# Patient Record
Sex: MALE | Race: BLACK OR AFRICAN AMERICAN | NOT HISPANIC OR LATINO | Employment: OTHER | URBAN - METROPOLITAN AREA
[De-identification: names, ages, dates, MRNs, and addresses within clinical notes are randomized per-mention and may not be internally consistent; named-entity substitution may affect disease eponyms.]

---

## 2022-11-01 ENCOUNTER — APPOINTMENT (OUTPATIENT)
Dept: CARDIOLOGY | Facility: HOSPITAL | Age: 62
End: 2022-11-01

## 2022-11-01 ENCOUNTER — APPOINTMENT (OUTPATIENT)
Dept: GENERAL RADIOLOGY | Facility: HOSPITAL | Age: 62
End: 2022-11-01

## 2022-11-01 ENCOUNTER — HOSPITAL ENCOUNTER (INPATIENT)
Facility: HOSPITAL | Age: 62
LOS: 2 days | Discharge: HOME | End: 2022-11-04
Attending: EMERGENCY MEDICINE | Admitting: INTERNAL MEDICINE

## 2022-11-01 DIAGNOSIS — I25.700 CORONARY ARTERY DISEASE INVOLVING CORONARY BYPASS GRAFT OF NATIVE HEART WITH UNSTABLE ANGINA PECTORIS: ICD-10-CM

## 2022-11-01 DIAGNOSIS — R07.2 PRECORDIAL PAIN: Primary | ICD-10-CM

## 2022-11-01 DIAGNOSIS — R07.9 CHEST PAIN, UNSPECIFIED TYPE: ICD-10-CM

## 2022-11-01 DIAGNOSIS — I20.0 UNSTABLE ANGINA: ICD-10-CM

## 2022-11-01 LAB
ALBUMIN SERPL-MCNC: 4.5 G/DL (ref 3.5–5.2)
ALBUMIN/GLOB SERPL: 1.3 G/DL
ALP SERPL-CCNC: 90 U/L (ref 39–117)
ALT SERPL W P-5'-P-CCNC: 38 U/L (ref 1–41)
ANION GAP SERPL CALCULATED.3IONS-SCNC: 11 MMOL/L (ref 5–15)
ANION GAP SERPL CALCULATED.3IONS-SCNC: 9 MMOL/L (ref 5–15)
ASCENDING AORTA: 3.2 CM
AST SERPL-CCNC: 30 U/L (ref 1–40)
BASOPHILS # BLD AUTO: 0.01 10*3/MM3 (ref 0–0.2)
BASOPHILS NFR BLD AUTO: 0.2 % (ref 0–1.5)
BH CV ECHO MEAS - AO MAX PG: 3.7 MMHG
BH CV ECHO MEAS - AO MEAN PG: 1.67 MMHG
BH CV ECHO MEAS - AO ROOT DIAM: 3.2 CM
BH CV ECHO MEAS - AO V2 MAX: 95.7 CM/SEC
BH CV ECHO MEAS - AO V2 VTI: 18 CM
BH CV ECHO MEAS - AVA(I,D): 2.9 CM2
BH CV ECHO MEAS - EDV(CUBED): 59.3 ML
BH CV ECHO MEAS - EDV(MOD-SP2): 74.3 ML
BH CV ECHO MEAS - EDV(MOD-SP4): 80.6 ML
BH CV ECHO MEAS - EF(MOD-BP): 52 %
BH CV ECHO MEAS - EF(MOD-SP2): 47.5 %
BH CV ECHO MEAS - EF(MOD-SP4): 57.8 %
BH CV ECHO MEAS - ESV(CUBED): 22 ML
BH CV ECHO MEAS - ESV(MOD-SP2): 39 ML
BH CV ECHO MEAS - ESV(MOD-SP4): 34 ML
BH CV ECHO MEAS - FS: 28.2 %
BH CV ECHO MEAS - IVS/LVPW: 1.08 CM
BH CV ECHO MEAS - IVSD: 1.3 CM
BH CV ECHO MEAS - LA DIMENSION: 3 CM
BH CV ECHO MEAS - LAT PEAK E' VEL: 8.6 CM/SEC
BH CV ECHO MEAS - LV DIASTOLIC VOL/BSA (35-75): 50.1 CM2
BH CV ECHO MEAS - LV MASS(C)D: 169.4 GRAMS
BH CV ECHO MEAS - LV MAX PG: 3.4 MMHG
BH CV ECHO MEAS - LV MEAN PG: 2 MMHG
BH CV ECHO MEAS - LV SYSTOLIC VOL/BSA (12-30): 21.1 CM2
BH CV ECHO MEAS - LV V1 MAX: 92.8 CM/SEC
BH CV ECHO MEAS - LV V1 VTI: 16.4 CM
BH CV ECHO MEAS - LVIDD: 3.9 CM
BH CV ECHO MEAS - LVIDS: 2.8 CM
BH CV ECHO MEAS - LVOT AREA: 3.1 CM2
BH CV ECHO MEAS - LVOT DIAM: 2 CM
BH CV ECHO MEAS - LVPWD: 1.2 CM
BH CV ECHO MEAS - MED PEAK E' VEL: 4.6 CM/SEC
BH CV ECHO MEAS - MV A MAX VEL: 84.9 CM/SEC
BH CV ECHO MEAS - MV DEC SLOPE: 207 CM/SEC2
BH CV ECHO MEAS - MV DEC TIME: 0.27 MSEC
BH CV ECHO MEAS - MV E MAX VEL: 32.7 CM/SEC
BH CV ECHO MEAS - MV E/A: 0.39
BH CV ECHO MEAS - MV MAX PG: 3 MMHG
BH CV ECHO MEAS - MV MEAN PG: 1 MMHG
BH CV ECHO MEAS - MV P1/2T: 66.9 MSEC
BH CV ECHO MEAS - MV V2 VTI: 18.7 CM
BH CV ECHO MEAS - MVA(P1/2T): 3.3 CM2
BH CV ECHO MEAS - MVA(VTI): 2.8 CM2
BH CV ECHO MEAS - PA ACC TIME: 0.14 SEC
BH CV ECHO MEAS - PA PR(ACCEL): 18.2 MMHG
BH CV ECHO MEAS - PA V2 MAX: 115 CM/SEC
BH CV ECHO MEAS - SI(MOD-SP2): 21.9 ML/M2
BH CV ECHO MEAS - SI(MOD-SP4): 28.9 ML/M2
BH CV ECHO MEAS - SV(LVOT): 51.5 ML
BH CV ECHO MEAS - SV(MOD-SP2): 35.3 ML
BH CV ECHO MEAS - SV(MOD-SP4): 46.6 ML
BH CV ECHO MEAS - TAPSE (>1.6): 1.99 CM
BH CV ECHO MEASUREMENTS AVERAGE E/E' RATIO: 4.95
BH CV VAS BP LEFT ARM: NORMAL MMHG
BH CV XLRA - RV BASE: 3.2 CM
BH CV XLRA - RV LENGTH: 6 CM
BH CV XLRA - RV MID: 2.2 CM
BH CV XLRA - TDI S': 11.5 CM/SEC
BILIRUB SERPL-MCNC: 0.4 MG/DL (ref 0–1.2)
BUN SERPL-MCNC: 18 MG/DL (ref 8–23)
BUN SERPL-MCNC: 20 MG/DL (ref 8–23)
BUN/CREAT SERPL: 16 (ref 7–25)
BUN/CREAT SERPL: 17.1 (ref 7–25)
CALCIUM SPEC-SCNC: 8.7 MG/DL (ref 8.6–10.5)
CALCIUM SPEC-SCNC: 9.5 MG/DL (ref 8.6–10.5)
CHLORIDE SERPL-SCNC: 103 MMOL/L (ref 98–107)
CHLORIDE SERPL-SCNC: 103 MMOL/L (ref 98–107)
CO2 SERPL-SCNC: 27 MMOL/L (ref 22–29)
CO2 SERPL-SCNC: 27 MMOL/L (ref 22–29)
CREAT SERPL-MCNC: 1.05 MG/DL (ref 0.76–1.27)
CREAT SERPL-MCNC: 1.25 MG/DL (ref 0.76–1.27)
DEPRECATED RDW RBC AUTO: 43.9 FL (ref 37–54)
EGFRCR SERPLBLD CKD-EPI 2021: 65.1 ML/MIN/1.73
EGFRCR SERPLBLD CKD-EPI 2021: 80.3 ML/MIN/1.73
EOSINOPHIL # BLD AUTO: 0.08 10*3/MM3 (ref 0–0.4)
EOSINOPHIL NFR BLD AUTO: 1.5 % (ref 0.3–6.2)
ERYTHROCYTE [DISTWIDTH] IN BLOOD BY AUTOMATED COUNT: 14.4 % (ref 12.3–15.4)
GLOBULIN UR ELPH-MCNC: 3.6 GM/DL
GLUCOSE SERPL-MCNC: 119 MG/DL (ref 65–99)
GLUCOSE SERPL-MCNC: 119 MG/DL (ref 65–99)
HCT VFR BLD AUTO: 49.8 % (ref 37.5–51)
HGB BLD-MCNC: 15.8 G/DL (ref 13–17.7)
HOLD SPECIMEN: NORMAL
IMM GRANULOCYTES # BLD AUTO: 0.01 10*3/MM3 (ref 0–0.05)
IMM GRANULOCYTES NFR BLD AUTO: 0.2 % (ref 0–0.5)
LEFT ATRIUM VOLUME INDEX: 22.2 ML/M2
LIPASE SERPL-CCNC: 40 U/L (ref 13–60)
LYMPHOCYTES # BLD AUTO: 2.56 10*3/MM3 (ref 0.7–3.1)
LYMPHOCYTES NFR BLD AUTO: 47.8 % (ref 19.6–45.3)
MAXIMAL PREDICTED HEART RATE: 158 BPM
MCH RBC QN AUTO: 26.5 PG (ref 26.6–33)
MCHC RBC AUTO-ENTMCNC: 31.7 G/DL (ref 31.5–35.7)
MCV RBC AUTO: 83.6 FL (ref 79–97)
MONOCYTES # BLD AUTO: 0.49 10*3/MM3 (ref 0.1–0.9)
MONOCYTES NFR BLD AUTO: 9.1 % (ref 5–12)
NEUTROPHILS NFR BLD AUTO: 2.21 10*3/MM3 (ref 1.7–7)
NEUTROPHILS NFR BLD AUTO: 41.2 % (ref 42.7–76)
NRBC BLD AUTO-RTO: 0 /100 WBC (ref 0–0.2)
NT-PROBNP SERPL-MCNC: 120.2 PG/ML (ref 0–900)
PLATELET # BLD AUTO: 282 10*3/MM3 (ref 140–450)
PMV BLD AUTO: 10.4 FL (ref 6–12)
POTASSIUM SERPL-SCNC: 4 MMOL/L (ref 3.5–5.2)
POTASSIUM SERPL-SCNC: 4.3 MMOL/L (ref 3.5–5.2)
PROT SERPL-MCNC: 8.1 G/DL (ref 6–8.5)
QT INTERVAL: 388 MS
QT INTERVAL: 420 MS
QTC INTERVAL: 447 MS
QTC INTERVAL: 459 MS
RBC # BLD AUTO: 5.96 10*6/MM3 (ref 4.14–5.8)
SODIUM SERPL-SCNC: 139 MMOL/L (ref 136–145)
SODIUM SERPL-SCNC: 141 MMOL/L (ref 136–145)
STRESS TARGET HR: 134 BPM
TROPONIN T SERPL-MCNC: 0.03 NG/ML (ref 0–0.03)
TROPONIN T SERPL-MCNC: 0.03 NG/ML (ref 0–0.03)
WBC NRBC COR # BLD: 5.36 10*3/MM3 (ref 3.4–10.8)
WHOLE BLOOD HOLD COAG: NORMAL
WHOLE BLOOD HOLD SPECIMEN: NORMAL

## 2022-11-01 PROCEDURE — 85025 COMPLETE CBC W/AUTO DIFF WBC: CPT | Performed by: EMERGENCY MEDICINE

## 2022-11-01 PROCEDURE — 93306 TTE W/DOPPLER COMPLETE: CPT

## 2022-11-01 PROCEDURE — 84484 ASSAY OF TROPONIN QUANT: CPT | Performed by: EMERGENCY MEDICINE

## 2022-11-01 PROCEDURE — 4A023N7 MEASUREMENT OF CARDIAC SAMPLING AND PRESSURE, LEFT HEART, PERCUTANEOUS APPROACH: ICD-10-PCS | Performed by: INTERNAL MEDICINE

## 2022-11-01 PROCEDURE — C1894 INTRO/SHEATH, NON-LASER: HCPCS | Performed by: INTERNAL MEDICINE

## 2022-11-01 PROCEDURE — 25010000002 MIDAZOLAM PER 1 MG: Performed by: INTERNAL MEDICINE

## 2022-11-01 PROCEDURE — 25010000002 FENTANYL CITRATE (PF) 50 MCG/ML SOLUTION: Performed by: INTERNAL MEDICINE

## 2022-11-01 PROCEDURE — 93005 ELECTROCARDIOGRAM TRACING: CPT | Performed by: EMERGENCY MEDICINE

## 2022-11-01 PROCEDURE — 93799 UNLISTED CV SVC/PROCEDURE: CPT | Performed by: INTERNAL MEDICINE

## 2022-11-01 PROCEDURE — B2111ZZ FLUOROSCOPY OF MULTIPLE CORONARY ARTERIES USING LOW OSMOLAR CONTRAST: ICD-10-PCS | Performed by: INTERNAL MEDICINE

## 2022-11-01 PROCEDURE — 83690 ASSAY OF LIPASE: CPT | Performed by: EMERGENCY MEDICINE

## 2022-11-01 PROCEDURE — 93571 IV DOP VEL&/PRESS C FLO 1ST: CPT | Performed by: INTERNAL MEDICINE

## 2022-11-01 PROCEDURE — 99222 1ST HOSP IP/OBS MODERATE 55: CPT | Performed by: INTERNAL MEDICINE

## 2022-11-01 PROCEDURE — 4A0335C MEASUREMENT OF ARTERIAL FLOW, CORONARY, PERCUTANEOUS APPROACH: ICD-10-PCS | Performed by: INTERNAL MEDICINE

## 2022-11-01 PROCEDURE — 0 IOPAMIDOL PER 1 ML: Performed by: INTERNAL MEDICINE

## 2022-11-01 PROCEDURE — 25010000002 HEPARIN (PORCINE) PER 1000 UNITS: Performed by: INTERNAL MEDICINE

## 2022-11-01 PROCEDURE — 99285 EMERGENCY DEPT VISIT HI MDM: CPT

## 2022-11-01 PROCEDURE — 83880 ASSAY OF NATRIURETIC PEPTIDE: CPT | Performed by: EMERGENCY MEDICINE

## 2022-11-01 PROCEDURE — 80053 COMPREHEN METABOLIC PANEL: CPT | Performed by: EMERGENCY MEDICINE

## 2022-11-01 PROCEDURE — C1769 GUIDE WIRE: HCPCS | Performed by: INTERNAL MEDICINE

## 2022-11-01 PROCEDURE — 93306 TTE W/DOPPLER COMPLETE: CPT | Performed by: INTERNAL MEDICINE

## 2022-11-01 PROCEDURE — 25010000002 PHENYLEPHRINE 10 MG/ML SOLUTION: Performed by: INTERNAL MEDICINE

## 2022-11-01 PROCEDURE — 93458 L HRT ARTERY/VENTRICLE ANGIO: CPT | Performed by: INTERNAL MEDICINE

## 2022-11-01 PROCEDURE — 71045 X-RAY EXAM CHEST 1 VIEW: CPT

## 2022-11-01 RX ORDER — FENTANYL CITRATE 50 UG/ML
INJECTION, SOLUTION INTRAMUSCULAR; INTRAVENOUS
Status: DISCONTINUED | OUTPATIENT
Start: 2022-11-01 | End: 2022-11-02

## 2022-11-01 RX ORDER — LIDOCAINE HYDROCHLORIDE 10 MG/ML
INJECTION, SOLUTION EPIDURAL; INFILTRATION; INTRACAUDAL; PERINEURAL
Status: DISCONTINUED | OUTPATIENT
Start: 2022-11-01 | End: 2022-11-01 | Stop reason: HOSPADM

## 2022-11-01 RX ORDER — PHENYLEPHRINE HYDROCHLORIDE 10 MG/ML
INJECTION INTRAVENOUS
Status: DISCONTINUED | OUTPATIENT
Start: 2022-11-01 | End: 2022-11-01 | Stop reason: HOSPADM

## 2022-11-01 RX ORDER — NITROGLYCERIN 20 MG/100ML
10-50 INJECTION INTRAVENOUS
Status: DISCONTINUED | OUTPATIENT
Start: 2022-11-01 | End: 2022-11-01

## 2022-11-01 RX ORDER — NEBIVOLOL 5 MG/1
10 TABLET ORAL
Status: DISCONTINUED | OUTPATIENT
Start: 2022-11-02 | End: 2022-11-03

## 2022-11-01 RX ORDER — FENTANYL CITRATE 50 UG/ML
INJECTION, SOLUTION INTRAMUSCULAR; INTRAVENOUS
Status: DISCONTINUED | OUTPATIENT
Start: 2022-11-01 | End: 2022-11-01 | Stop reason: HOSPADM

## 2022-11-01 RX ORDER — ROSUVASTATIN CALCIUM 20 MG/1
20 TABLET, COATED ORAL NIGHTLY
Status: DISCONTINUED | OUTPATIENT
Start: 2022-11-01 | End: 2022-11-02

## 2022-11-01 RX ORDER — NITROGLYCERIN 20 MG/100ML
10-50 INJECTION INTRAVENOUS
Status: DISCONTINUED | OUTPATIENT
Start: 2022-11-01 | End: 2022-11-02

## 2022-11-01 RX ORDER — MIDAZOLAM HYDROCHLORIDE 1 MG/ML
INJECTION INTRAMUSCULAR; INTRAVENOUS
Status: DISCONTINUED | OUTPATIENT
Start: 2022-11-01 | End: 2022-11-01 | Stop reason: HOSPADM

## 2022-11-01 RX ORDER — ASPIRIN 81 MG/1
81 TABLET ORAL DAILY
Status: DISCONTINUED | OUTPATIENT
Start: 2022-11-02 | End: 2022-11-04 | Stop reason: HOSPADM

## 2022-11-01 RX ORDER — SODIUM CHLORIDE 0.9 % (FLUSH) 0.9 %
10 SYRINGE (ML) INJECTION AS NEEDED
Status: DISCONTINUED | OUTPATIENT
Start: 2022-11-01 | End: 2022-11-04 | Stop reason: HOSPADM

## 2022-11-01 RX ORDER — NICARDIPINE HCL-0.9% SOD CHLOR 1 MG/10 ML
SYRINGE (ML) INTRAVENOUS
Status: DISCONTINUED | OUTPATIENT
Start: 2022-11-01 | End: 2022-11-01 | Stop reason: HOSPADM

## 2022-11-01 RX ORDER — HEPARIN SODIUM 1000 [USP'U]/ML
INJECTION, SOLUTION INTRAVENOUS; SUBCUTANEOUS
Status: DISCONTINUED | OUTPATIENT
Start: 2022-11-01 | End: 2022-11-01 | Stop reason: HOSPADM

## 2022-11-01 RX ORDER — ACETAMINOPHEN 325 MG/1
650 TABLET ORAL EVERY 4 HOURS PRN
Status: DISCONTINUED | OUTPATIENT
Start: 2022-11-01 | End: 2022-11-03

## 2022-11-01 RX ORDER — ASPIRIN 81 MG/1
324 TABLET, CHEWABLE ORAL ONCE
Status: COMPLETED | OUTPATIENT
Start: 2022-11-01 | End: 2022-11-01

## 2022-11-01 RX ADMIN — NITROGLYCERIN 10 MCG/MIN: 20 INJECTION INTRAVENOUS at 20:00

## 2022-11-01 RX ADMIN — NITROGLYCERIN 10 MCG/MIN: 20 INJECTION INTRAVENOUS at 12:01

## 2022-11-01 RX ADMIN — ASPIRIN 81 MG 324 MG: 81 TABLET ORAL at 09:39

## 2022-11-01 RX ADMIN — ROSUVASTATIN 20 MG: 20 TABLET, FILM COATED ORAL at 19:58

## 2022-11-01 NOTE — H&P
[facility]   [address], [address]  [place], [place], [address]    [phone]  WWW.[facility]E2[facility] NetworksSaint [facility]           HISTORY AND PHYSICAL NOTE    Patient Care Team:  Patient Care Team:  Provider, No Known as PCP - General      Chief complaint:   Chief Complaint   Patient presents with   • Chest Pain          HPI:    Kip Jones is a 62 y.o. male.    Cardiac focused problem list:  1. Exertional chest pain and dyspnea  a. Ongoing for the last week.   2. Hypertensive urgency    Patient presented to [facility] ED today with complaints of exertional chest pain and dyspnea over the last week.  Notes this has occurred several times a day over the last week.  Has been self-limiting.  Describes it as a pressing pain in the midsternal area with associated shortness of breath.  Due to persistent chest pain, patient presented to the ED today for further evaluation.  Currently without chest pain.  Does note that his chest pain resolved prior to arrival to the ED.  Blood pressure significantly elevated in the ED, 186/106. Work-up in the ED revealing troponin 0.030, normal proBNP, EKG with T wave inversions in the lateral leads.    He denies any cardiac history.  Otherwise healthy, without any notable medical problems in the past.  Has a physical yearly for his job that has historically been unremarkable.  Does not take any home medications.  His brother may have had prior cardiac stents.  No other family history of coronary disease.  No prior cardiac testing.  Denies tobacco, EtOH or drug use.  Is fairly active but no regular forms of exercise.    Review of Systems:  Positive for exertional chest pain, dyspnea  All other systems reviewed and are negative.    PFSH:  There is no problem list on file for this patient.      No current facility-administered medications on file prior to encounter.     No current outpatient medications on file prior to encounter.     No Known Allergies    Social History      Socioeconomic History   • Marital status:      No family history on file.         Objective:     Vital Sign Min/Max for last 24 hours  Temp  Min: 98.5 °F (36.9 °C)  Max: 98.5 °F (36.9 °C)   BP  Min: 172/110  Max: 223/125   Pulse  Min: 68  Max: 83   Resp  Min: 16  Max: 16   SpO2  Min: 96 %  Max: 98 %   No data recorded    No intake or output data in the 24 hours ending 11/01/22 1117        Vitals:    11/01/22 1100   BP: (!) 186/106   Pulse: 68   Resp:    Temp:    SpO2: 98%       CONSTITUTIONAL: Well-nourished. In no acute distress.   SKIN: Warm and dry. No rashes noted  HEENT: Head is normocephalic and atraumatic. Mucous membranes are pink and moist.    LUNGS: Normal effort. Clear to auscultation bilaterally without wheezing, rhonchi, or rales noted.   CARDIOVASCULAR: Regular rate and rhythm with a normal S1 and S2. There is no murmur, gallop, rub, or click appreciated.   PERIPHERAL VASCULAR: Carotid upstroke is 2+ bilaterally and without bruits. Radial pulses are 2+ bilaterally. Right radial Barbeau type a. There is no lower extremity edema.  2+ PT pulses bilaterally  ABDOMEN: Normal bowel sounds.  Soft with no tenderness with palpitation.   MUSCULOSKELETAL:  No digital cyanosis  NEUROLOGICAL: Nonfocal.  PSYCHIATRIC: Alert, orientated x 3, appropriate affect and mood    Lab results reviewed by myself:  Lab Results   Component Value Date    TROPONINT 0.030 11/01/2022       No results found for: CHOL  No results found for: TRIG  No results found for: HDL  No results found for: LDL  No components found for: LDLDIRECTC    Diagnostic Data:    EKG 11/01/2022: NSR with TWI in the lateral leads        Tele: NSR         Assessment and Plan:     Problem list:    * No active hospital problems. *      ASSESSMENT:  1. Chest pain, dyspnea  a. Ongoing intermittent, exertional chest pain. Presented with pain at rest, consistent with accelerating, unstable angina  b. Borderline initial troponin 0.030, trending  c. EKG  with T wave inversions in the lateral leads, possibly ischemia vs repolarization abnormality/LVH  2. Hypertensive urgency   a. Patient with normal BPs at annual PCP visits. First time with this degree of an abnormal BP    PLAN:  1. Recommend left heart cathterization +/- PCI via right radial approach with Dr. Mccall today.  NPO for cath  2. Echocardiogram to evaluate for structural or functional abnormalities.    3. Nitroglycerin gtt, titrate for goal SBP around 150.   4. Continue aspirin  5. Lipid panel and A1c  6. Additional recommendations to follow    Scribed for Dr. Mccall by Vicenta Knight, APRN. 11/1/2022  11:17 EDT    I, Quentin Mccall MD, personally performed the services as scribed by the above named individual. I have made any necessary edits and it is both accurate and complete.     Quentin Mccall MD, MSc, FACC, [facility]  Interventional Cardiology  [facility]

## 2022-11-01 NOTE — ED PROVIDER NOTES
Subjective   History of Present Illness  62-year-old male who presents for evaluation of chest pain.  The patient denies a known history of coronary artery disease.  He reports that over the last week he has had intermittent episodes of chest pressure in the center of his chest.  He denies any radiation to his neck, shoulders, arms, or back.  He does report that with increased activity the chest pain becomes more prominent and he does have some shortness of breath.  The chest pain seems to be exacerbated by activity.  Denies nausea vomiting.  No reported diaphoresis.  Denies fever, bodies, or chills.  He denies trauma or injury to his chest or abdomen.  No reported change in bowel or urinary function.  He has no pain at rest.  He denies smoking.  No known history of high blood pressure, high cholesterol, or diabetes.  No known history of 1st degree relative family coronary artery disease.  No other acute complaints.        Review of Systems   Constitutional: Negative for chills, fatigue and fever.   HENT: Negative for congestion, ear pain, postnasal drip, sinus pressure and sore throat.    Eyes: Negative for pain, redness and visual disturbance.   Respiratory: Positive for shortness of breath. Negative for cough and chest tightness.    Cardiovascular: Positive for chest pain. Negative for palpitations and leg swelling.   Gastrointestinal: Negative for abdominal pain, anal bleeding, blood in stool, diarrhea, nausea and vomiting.   Endocrine: Negative for polydipsia and polyuria.   Genitourinary: Negative for difficulty urinating, dysuria, frequency and urgency.   Musculoskeletal: Negative for arthralgias, back pain and neck pain.   Skin: Negative for pallor and rash.   Allergic/Immunologic: Negative for environmental allergies and immunocompromised state.   Neurological: Negative for dizziness, weakness and headaches.   Hematological: Negative for adenopathy.   Psychiatric/Behavioral: Negative for confusion,  self-injury and suicidal ideas. The patient is not nervous/anxious.    All other systems reviewed and are negative.      No past medical history on file.    No Known Allergies    No past surgical history on file.    No family history on file.    Social History     Socioeconomic History   • Marital status:            Objective   Physical Exam  Vitals and nursing note reviewed.   Constitutional:       General: He is not in acute distress.     Appearance: Normal appearance. He is well-developed. He is not toxic-appearing or diaphoretic.   HENT:      Head: Normocephalic and atraumatic.      Right Ear: External ear normal.      Left Ear: External ear normal.      Nose: Nose normal.   Eyes:      General: Lids are normal.      Pupils: Pupils are equal, round, and reactive to light.   Neck:      Trachea: No tracheal deviation.   Cardiovascular:      Rate and Rhythm: Normal rate and regular rhythm.      Pulses: No decreased pulses.      Heart sounds: Normal heart sounds. No murmur heard.    No friction rub. No gallop.   Pulmonary:      Effort: Pulmonary effort is normal. No respiratory distress.      Breath sounds: Normal breath sounds. No decreased breath sounds, wheezing, rhonchi or rales.   Abdominal:      General: Bowel sounds are normal.      Palpations: Abdomen is soft.      Tenderness: There is no abdominal tenderness. There is no guarding or rebound.   Musculoskeletal:         General: No deformity. Normal range of motion.      Cervical back: Normal range of motion and neck supple.   Lymphadenopathy:      Cervical: No cervical adenopathy.   Skin:     General: Skin is warm and dry.      Findings: No rash.   Neurological:      Mental Status: He is alert and oriented to person, place, and time.      Cranial Nerves: No cranial nerve deficit.      Sensory: No sensory deficit.   Psychiatric:         Speech: Speech normal.         Behavior: Behavior normal.         Thought Content: Thought content normal.          Judgment: Judgment normal.         Procedures           ED Course                                           MDM  Number of Diagnoses or Management Options  Chest pain, unspecified type: new and requires workup  Unstable angina (HCC): new and requires workup  Diagnosis management comments: Patient was evaluated by the cardiology service here in the ER per note review the intent on take the patient for heart catheterization.  The patient has remained stable at this given time.       Amount and/or Complexity of Data Reviewed  Clinical lab tests: ordered and reviewed  Tests in the radiology section of CPT®: ordered and reviewed  Review and summarize past medical records: yes  Independent visualization of images, tracings, or specimens: yes        Final diagnoses:   Unstable angina (HCC)   Chest pain, unspecified type       ED Disposition  ED Disposition     ED Disposition   Send to Cath Lab    Condition   --    Comment   --             No follow-up provider specified.       Medication List      No changes were made to your prescriptions during this visit.          America Shrestha MD  11/01/22 4753

## 2022-11-01 NOTE — PLAN OF CARE
Goal Outcome Evaluation:  Plan of Care Reviewed With: patient, spouse        Progress: no change  Outcome Evaluation: VSS, denies pain or SOA on room air.  Patient admitted to room 602 at aprox 1715 from [facility].  Is A+Ox4, right radial site with TR-band in place.  Plan is for heart cath 11-3-22 via the groin per report.  Patients wife at the bedside and is aware of the patients condition.  Will continue with current POC.

## 2022-11-02 PROBLEM — I20.0 UNSTABLE ANGINA: Status: ACTIVE | Noted: 2022-11-02

## 2022-11-02 PROBLEM — I25.700 CORONARY ARTERY DISEASE INVOLVING CORONARY BYPASS GRAFT OF NATIVE HEART WITH UNSTABLE ANGINA PECTORIS: Status: ACTIVE | Noted: 2022-11-01

## 2022-11-02 LAB
ANION GAP SERPL CALCULATED.3IONS-SCNC: 13 MMOL/L (ref 5–15)
BUN SERPL-MCNC: 17 MG/DL (ref 8–23)
BUN/CREAT SERPL: 14.2 (ref 7–25)
CALCIUM SPEC-SCNC: 8.9 MG/DL (ref 8.6–10.5)
CHLORIDE SERPL-SCNC: 101 MMOL/L (ref 98–107)
CHOLEST SERPL-MCNC: 232 MG/DL (ref 0–200)
CO2 SERPL-SCNC: 24 MMOL/L (ref 22–29)
CREAT SERPL-MCNC: 1.2 MG/DL (ref 0.76–1.27)
EGFRCR SERPLBLD CKD-EPI 2021: 68.4 ML/MIN/1.73
GLUCOSE SERPL-MCNC: 95 MG/DL (ref 65–99)
HBA1C MFR BLD: 5.8 % (ref 4.8–5.6)
HDLC SERPL-MCNC: 54 MG/DL (ref 40–60)
LDLC SERPL CALC-MCNC: 157 MG/DL (ref 0–100)
LDLC/HDLC SERPL: 2.85 {RATIO}
POTASSIUM SERPL-SCNC: 4.7 MMOL/L (ref 3.5–5.2)
SODIUM SERPL-SCNC: 138 MMOL/L (ref 136–145)
TRIGL SERPL-MCNC: 120 MG/DL (ref 0–150)
VLDLC SERPL-MCNC: 21 MG/DL (ref 5–40)

## 2022-11-02 PROCEDURE — 83036 HEMOGLOBIN GLYCOSYLATED A1C: CPT | Performed by: INTERNAL MEDICINE

## 2022-11-02 PROCEDURE — 80048 BASIC METABOLIC PNL TOTAL CA: CPT | Performed by: INTERNAL MEDICINE

## 2022-11-02 PROCEDURE — 99232 SBSQ HOSP IP/OBS MODERATE 35: CPT | Performed by: INTERNAL MEDICINE

## 2022-11-02 PROCEDURE — 80061 LIPID PANEL: CPT | Performed by: INTERNAL MEDICINE

## 2022-11-02 RX ORDER — NITROGLYCERIN 20 MG/100ML
10-50 INJECTION INTRAVENOUS
Status: DISCONTINUED | OUTPATIENT
Start: 2022-11-02 | End: 2022-11-03

## 2022-11-02 RX ORDER — ROSUVASTATIN CALCIUM 20 MG/1
40 TABLET, COATED ORAL NIGHTLY
Status: DISCONTINUED | OUTPATIENT
Start: 2022-11-02 | End: 2022-11-04 | Stop reason: HOSPADM

## 2022-11-02 RX ADMIN — NEBIVOLOL 10 MG: 5 TABLET ORAL at 08:22

## 2022-11-02 RX ADMIN — ASPIRIN 81 MG: 81 TABLET, COATED ORAL at 08:22

## 2022-11-02 RX ADMIN — NITROGLYCERIN 15 MCG/MIN: 20 INJECTION INTRAVENOUS at 07:52

## 2022-11-02 RX ADMIN — NITROGLYCERIN 25 MCG/MIN: 20 INJECTION INTRAVENOUS at 10:29

## 2022-11-02 NOTE — PLAN OF CARE
VSS, pt resting well with no current complaints. R radial wrist site has no issues. Will continue to monitor.  Problem: Adult Inpatient Plan of Care  Goal: Plan of Care Review  Outcome: Ongoing, Progressing  Goal: Patient-Specific Goal (Individualized)  Outcome: Ongoing, Progressing  Goal: Absence of Hospital-Acquired Illness or Injury  Outcome: Ongoing, Progressing  Intervention: Identify and Manage Fall Risk  Recent Flowsheet Documentation  Taken 11/2/2022 0600 by Rhys Soni RN  Safety Promotion/Fall Prevention: activity supervised  Taken 11/2/2022 0400 by Ryhs Soni RN  Safety Promotion/Fall Prevention: activity supervised  Taken 11/2/2022 0200 by Rhys Soni RN  Safety Promotion/Fall Prevention:   assistive device/personal items within reach   activity supervised   clutter free environment maintained   fall prevention program maintained   nonskid shoes/slippers when out of bed  Taken 11/2/2022 0000 by Rhsy Soni RN  Safety Promotion/Fall Prevention: activity supervised  Taken 11/1/2022 2200 by Rhys Soni RN  Safety Promotion/Fall Prevention: activity supervised  Taken 11/1/2022 2000 by Rhys Soni RN  Safety Promotion/Fall Prevention:   activity supervised   assistive device/personal items within reach   clutter free environment maintained   fall prevention program maintained   nonskid shoes/slippers when out of bed  Intervention: Prevent Skin Injury  Recent Flowsheet Documentation  Taken 11/2/2022 0600 by Rhys Soni RN  Body Position: position changed independently  Taken 11/2/2022 0400 by Rhys Soni RN  Body Position: position changed independently  Taken 11/2/2022 0200 by Rhys Soni RN  Body Position: position changed independently  Taken 11/2/2022 0000 by Rhys Soni RN  Body Position: position changed independently  Taken 11/1/2022 2200 by Rhys Soni RN  Body Position: position changed independently  Taken 11/1/2022 2000 by Rhys Soni RN  Body Position: position changed  independently  Intervention: Prevent and Manage VTE (Venous Thromboembolism) Risk  Recent Flowsheet Documentation  Taken 11/2/2022 0600 by Rhys Soni RN  Activity Management: activity adjusted per tolerance  Taken 11/2/2022 0400 by Rhys Soni RN  Activity Management: activity adjusted per tolerance  Taken 11/2/2022 0200 by Rhys Soni RN  Activity Management: activity adjusted per tolerance  Taken 11/2/2022 0000 by Rhys Soni RN  Activity Management: activity adjusted per tolerance  Taken 11/1/2022 2200 by Rhys Soni RN  Activity Management: activity adjusted per tolerance  Taken 11/1/2022 2000 by Rhys Soni RN  Activity Management: activity adjusted per tolerance  VTE Prevention/Management: sequential compression devices off  Intervention: Prevent Infection  Recent Flowsheet Documentation  Taken 11/2/2022 0600 by Rhys Soni RN  Infection Prevention: environmental surveillance performed  Taken 11/2/2022 0400 by Rhys Soni RN  Infection Prevention: environmental surveillance performed  Taken 11/2/2022 0200 by Rhys Soni RN  Infection Prevention: environmental surveillance performed  Taken 11/2/2022 0000 by Rhys Soni RN  Infection Prevention: environmental surveillance performed  Taken 11/1/2022 2200 by Rhys Soni RN  Infection Prevention: environmental surveillance performed  Taken 11/1/2022 2000 by Rhys Soni RN  Infection Prevention: environmental surveillance performed  Goal: Optimal Comfort and Wellbeing  Outcome: Ongoing, Progressing  Goal: Readiness for Transition of Care  Outcome: Ongoing, Progressing     Problem: Chest Pain  Goal: Resolution of Chest Pain Symptoms  Outcome: Ongoing, Progressing   Goal Outcome Evaluation:

## 2022-11-02 NOTE — CASE MANAGEMENT/SOCIAL WORK
Discharge Planning Assessment  [facility]     Patient Name: Nik Jones  MRN: [id number]  Today's Date: 11/2/2022    Admit Date: 11/1/2022    Plan: discharge plan   Discharge Needs Assessment     Row Name 11/02/22 1555       Living Environment    People in Home spouse    Name(s) of People in Home Hanna(spouse)    Current Living Arrangements apartment    Primary Care Provided by self    Provides Primary Care For no one    Family Caregiver if Needed spouse    Family Caregiver Names Hanna(spouse)    Quality of Family Relationships involved;supportive    Able to Return to Prior Arrangements yes    Living Arrangement Comments I spoke with pt and pt's spouse in room with permission regarding discharge plan. [place] apartment with spouse.       Transition Planning    Patient/Family Anticipates Transition to home with family    Patient/Family Anticipated Services at Transition     Transportation Anticipated family or friend will provide       Discharge Needs Assessment    Readmission Within the Last 30 Days no previous admission in last 30 days    Equipment Currently Used at Home none    Concerns to be Addressed discharge planning    Equipment Needed After Discharge none    Discharge Coordination/Progress Pt has [employer] with prescription coverage and uses [facility] on [address].Pt is independent with ADLs and uses not DME or HH. Pt here for precordial pain and having a heart cath. Pt did states he needs a new PCP appointment arranged at discharge int [facility].  CM will cont to follow               Discharge Plan     Row Name 11/02/22 1555       Plan    Plan discharge plan    Plan Comments Plan is home with spouse at discharge. Pt needs a new PCP in the [facility] at discharge. CM will cont to follow    Final Discharge Disposition Code 01 - home or self-care              Continued Care and Services - Admitted Since 11/1/2022    Coordination has not been started for  this encounter.       Expected Discharge Date and Time     Expected Discharge Date Expected Discharge Time    Nov 4, 2022          Demographic Summary     Row Name 11/02/22 0091       General Information    General Information Comments Pt reports he needs a PCP at discharge in the [facility]       Contact Information    Permission Granted to Share Info With     Contact Information Obtained for     Contact Information Comments Hanna Jones(spouse) [phone]               Functional Status    No documentation.                Psychosocial    No documentation.                Abuse/Neglect    No documentation.                Legal    No documentation.                Substance Abuse    No documentation.                Patient Forms    No documentation.                   Renee Tran RN

## 2022-11-02 NOTE — PLAN OF CARE
Goal Outcome Evaluation:  Plan of Care Reviewed With: patient, spouse        Progress: no change  Outcome Evaluation: Patient A+Ox4, has denied pain or SOA on room air.  HTN continues, Nitro gtt increased to 25mcg/min to keep [id number].  Patient scheduled for PCI for his CircumFlex 11-3-22.  Patient educated on the procedure and signed consent.  Patients wife at the bedside and is aware of the POC.

## 2022-11-02 NOTE — PROGRESS NOTES
"[facility] Progress Note     LOS: 0 days   Patient Care Team:  Provider, No Known as PCP - General  PCP:  Provider, No Known    Chief Complaint: Follow-up unstable angina    Subjective: Patient without complaints this morning.  He is chest pain-free.  He is hypertensive.  Received his first dose of Bystolic this morning.      Review of Systems:   All systems have been reviewed and are negative with the exception of those mentioned above.      Objective:    Vital Sign Min/Max for last 24 hours  Temp  Min: 97.6 °F (36.4 °C)  Max: 98.1 °F (36.7 °C)   BP  Min: 116/84  Max: 205/121   Pulse  Min: 63  Max: 82   Resp  Min: 16  Max: 18   SpO2  Min: 94 %  Max: 100 %   No data recorded   Weight  Min: 54.4 kg (119 lb 14.9 oz)  Max: 54.4 kg (119 lb 14.9 oz)     Flowsheet Rows    Flowsheet Row First Filed Value   Admission Height 167.6 cm (66\") Documented at 11/01/2022 0919   Admission Weight 54.4 kg (120 lb) Documented at 11/01/2022 0919          Telemetry: Sinus rhythm    No intake or output data in the 24 hours ending 11/02/22 0935  Intake & Output (last 3 days)     None           Physical Exam:  Constitutional:       Appearance: Healthy appearance. Not in distress.   Pulmonary:      Effort: Pulmonary effort is normal.   Cardiovascular:      Normal rate.      Murmurs: There is no murmur.      Comments: Right radial cath site with intact pulses no hematoma  Pulses:     Intact distal pulses.   Edema:     Peripheral edema absent.   Skin:     General: Skin is warm and dry.   Neurological:      General: No focal deficit present.          LABS/DIAGNOSTIC DATA:  Results from last 7 days   Lab Units 11/01/22  0934   WBC 10*3/mm3 5.36   HEMOGLOBIN g/dL 15.8   HEMATOCRIT % 49.8   PLATELETS 10*3/mm3 282     Lab Results   Lab Value Date/Time    TROPONINT 0.028 11/01/2022 1144    TROPONINT 0.030 11/01/2022 0934         Results from last 7 days   Lab Units 11/02/22  0759 11/01/22  1840 " 11/01/22  0934   SODIUM mmol/L 138 139 141   POTASSIUM mmol/L 4.7 4.3 4.0   CHLORIDE mmol/L 101 103 103   CO2 mmol/L 24.0 27.0 27.0   BUN mg/dL 17 18 20   CREATININE mg/dL 1.20 1.05 1.25   CALCIUM mg/dL 8.9 8.7 9.5   BILIRUBIN mg/dL  --   --  0.4   ALK PHOS U/L  --   --  90   ALT (SGPT) U/L  --   --  38   AST (SGOT) U/L  --   --  30   GLUCOSE mg/dL 95 119* 119*         Results from last 7 days   Lab Units 11/02/22  0759   CHOLESTEROL mg/dL 232*   TRIGLYCERIDES mg/dL 120   HDL CHOL mg/dL 54   LDL CHOL mg/dL 157*               Cardiac catheterization:    Left Main   The vessel is moderate in size and is angiographically normal.      Left Anterior Descending   The vessel is moderate in size. The vessel exhibits minimal luminal irregularities.      Ramus Intermedius   The vessel is moderate in size. There is a 60% tubular proximal segment stenosis that was not found to be functionally significant by IFR with a ratio of 0.92.      Left Circumflex   The vessel is large in size. There is a 70% mid segment stenosis. There is a 90% distal segment stenosis at the takeoff of the first posterior lateral branch. The first posterior lateral branch has an 80% stenosis. There is a 70% stenosis at the takeoff of the second posterior lateral branch.      Right Coronary Artery   The vessel is small in size. There is a 90% mid segment stenosis.          Echo:    Left ventricular systolic function is normal. Left ventricular ejection fraction appears to be 56 - 60%.  •  Left ventricular wall thickness is consistent with mild concentric hypertrophy.  •  The following left ventricular wall segments are hypokinetic: basal inferolateral and basal inferior.  •  Left ventricular diastolic function is consistent with (grade I) impaired relaxation.        Medication Review:   aspirin, 81 mg, Oral, Daily  nebivolol, 10 mg, Oral, Q24H  pharmacy consult - [place], , Does not apply, Daily  rosuvastatin, 20 mg, Oral, Nightly       nitroglycerin, 10-50  mcg/min, Last Rate: 15 mcg/min (11/02/22 0752)           Precordial pain      Assessment/Plan:    1.  Unstable angina  -Continue aspirin, Bystolic, statin  -Continue nitroglycerin drip for treatment of chest pain/hypertension.  Chest pain-free at this time  -N.p.o. after midnight for return to Cath Lab for PCI of the circumflex artery via femoral access.  -Total cholesterol 232 and  while on no statin medication    2.  Hypertension  -First dose Bystolic 10 mg given this a.m.  -We will titrate as needed  -Using nitroglycerin drip for goal blood pressure 130-160 mmHg systolic    Patient agreeable to proceed with procedure tomorrow.  Discussed with him and his wife.  N.p.o. after midnight      Teofilo Ruiz MD [facility]  11/02/22

## 2022-11-03 LAB
ACT BLD: 225 SECONDS (ref 82–152)
ACT BLD: 370 SECONDS (ref 82–152)
ANION GAP SERPL CALCULATED.3IONS-SCNC: 10 MMOL/L (ref 5–15)
BUN SERPL-MCNC: 22 MG/DL (ref 8–23)
BUN/CREAT SERPL: 20.4 (ref 7–25)
CALCIUM SPEC-SCNC: 8.3 MG/DL (ref 8.6–10.5)
CHLORIDE SERPL-SCNC: 105 MMOL/L (ref 98–107)
CO2 SERPL-SCNC: 25 MMOL/L (ref 22–29)
CREAT SERPL-MCNC: 1.08 MG/DL (ref 0.76–1.27)
EGFRCR SERPLBLD CKD-EPI 2021: 77.6 ML/MIN/1.73
GLUCOSE SERPL-MCNC: 91 MG/DL (ref 65–99)
POTASSIUM SERPL-SCNC: 4.4 MMOL/L (ref 3.5–5.2)
SODIUM SERPL-SCNC: 140 MMOL/L (ref 136–145)

## 2022-11-03 PROCEDURE — 92978 ENDOLUMINL IVUS OCT C 1ST: CPT | Performed by: INTERNAL MEDICINE

## 2022-11-03 PROCEDURE — C1725 CATH, TRANSLUMIN NON-LASER: HCPCS | Performed by: INTERNAL MEDICINE

## 2022-11-03 PROCEDURE — C1769 GUIDE WIRE: HCPCS | Performed by: INTERNAL MEDICINE

## 2022-11-03 PROCEDURE — 02713Z6 DILATION OF CORONARY ARTERY, TWO ARTERIES, BIFURCATION, PERCUTANEOUS APPROACH: ICD-10-PCS | Performed by: INTERNAL MEDICINE

## 2022-11-03 PROCEDURE — 99232 SBSQ HOSP IP/OBS MODERATE 35: CPT | Performed by: INTERNAL MEDICINE

## 2022-11-03 PROCEDURE — 85347 COAGULATION TIME ACTIVATED: CPT

## 2022-11-03 PROCEDURE — 25010000002 HEPARIN (PORCINE) PER 1000 UNITS: Performed by: INTERNAL MEDICINE

## 2022-11-03 PROCEDURE — C1894 INTRO/SHEATH, NON-LASER: HCPCS | Performed by: INTERNAL MEDICINE

## 2022-11-03 PROCEDURE — 0 IOPAMIDOL PER 1 ML: Performed by: INTERNAL MEDICINE

## 2022-11-03 PROCEDURE — C1874 STENT, COATED/COV W/DEL SYS: HCPCS | Performed by: INTERNAL MEDICINE

## 2022-11-03 PROCEDURE — C1760 CLOSURE DEV, VASC: HCPCS | Performed by: INTERNAL MEDICINE

## 2022-11-03 PROCEDURE — 92921 PR PRQ TRLUML CORONARY ANGIOPLASTY ADDL BRANCH: CPT | Performed by: INTERNAL MEDICINE

## 2022-11-03 PROCEDURE — 92921: CPT | Performed by: INTERNAL MEDICINE

## 2022-11-03 PROCEDURE — 92928 PRQ TCAT PLMT NTRAC ST 1 LES: CPT | Performed by: INTERNAL MEDICINE

## 2022-11-03 PROCEDURE — C1887 CATHETER, GUIDING: HCPCS | Performed by: INTERNAL MEDICINE

## 2022-11-03 PROCEDURE — B2111ZZ FLUOROSCOPY OF MULTIPLE CORONARY ARTERIES USING LOW OSMOLAR CONTRAST: ICD-10-PCS | Performed by: INTERNAL MEDICINE

## 2022-11-03 PROCEDURE — 027035Z DILATION OF CORONARY ARTERY, ONE ARTERY WITH TWO DRUG-ELUTING INTRALUMINAL DEVICES, PERCUTANEOUS APPROACH: ICD-10-PCS | Performed by: INTERNAL MEDICINE

## 2022-11-03 PROCEDURE — 80048 BASIC METABOLIC PNL TOTAL CA: CPT | Performed by: INTERNAL MEDICINE

## 2022-11-03 PROCEDURE — B240ZZ3 ULTRASONOGRAPHY OF SINGLE CORONARY ARTERY, INTRAVASCULAR: ICD-10-PCS | Performed by: INTERNAL MEDICINE

## 2022-11-03 PROCEDURE — C1753 CATH, INTRAVAS ULTRASOUND: HCPCS | Performed by: INTERNAL MEDICINE

## 2022-11-03 PROCEDURE — C9600 PERC DRUG-EL COR STENT SING: HCPCS | Performed by: INTERNAL MEDICINE

## 2022-11-03 PROCEDURE — 25010000002 FENTANYL CITRATE (PF) 50 MCG/ML SOLUTION: Performed by: INTERNAL MEDICINE

## 2022-11-03 PROCEDURE — 25010000002 MIDAZOLAM PER 1 MG: Performed by: INTERNAL MEDICINE

## 2022-11-03 DEVICE — XIENCE SKYPOINT™ EVEROLIMUS ELUTING CORONARY STENT SYSTEM 3.50 MM X 38 MM / RAPID-EXCHANGE
Type: IMPLANTABLE DEVICE | Status: FUNCTIONAL
Brand: XIENCE SKYPOINT™

## 2022-11-03 DEVICE — XIENCE SKYPOINT™ EVEROLIMUS ELUTING CORONARY STENT SYSTEM 4.00 MM X 18 MM / RAPID-EXCHANGE
Type: IMPLANTABLE DEVICE | Status: FUNCTIONAL
Brand: XIENCE SKYPOINT™

## 2022-11-03 RX ORDER — SODIUM CHLORIDE 9 MG/ML
60 INJECTION, SOLUTION INTRAVENOUS CONTINUOUS
Status: DISCONTINUED | OUTPATIENT
Start: 2022-11-03 | End: 2022-11-04 | Stop reason: HOSPADM

## 2022-11-03 RX ORDER — MIDAZOLAM HYDROCHLORIDE 1 MG/ML
INJECTION INTRAMUSCULAR; INTRAVENOUS
Status: DISCONTINUED | OUTPATIENT
Start: 2022-11-03 | End: 2022-11-03 | Stop reason: HOSPADM

## 2022-11-03 RX ORDER — ACETAMINOPHEN 325 MG/1
650 TABLET ORAL EVERY 4 HOURS PRN
Status: DISCONTINUED | OUTPATIENT
Start: 2022-11-03 | End: 2022-11-04 | Stop reason: HOSPADM

## 2022-11-03 RX ORDER — HYDRALAZINE HYDROCHLORIDE 25 MG/1
25 TABLET, FILM COATED ORAL EVERY 4 HOURS PRN
Status: DISCONTINUED | OUTPATIENT
Start: 2022-11-03 | End: 2022-11-04 | Stop reason: HOSPADM

## 2022-11-03 RX ORDER — AMLODIPINE BESYLATE 5 MG/1
5 TABLET ORAL
Status: DISCONTINUED | OUTPATIENT
Start: 2022-11-04 | End: 2022-11-03

## 2022-11-03 RX ORDER — CLOPIDOGREL BISULFATE 75 MG/1
75 TABLET ORAL DAILY
Status: DISCONTINUED | OUTPATIENT
Start: 2022-11-04 | End: 2022-11-04 | Stop reason: HOSPADM

## 2022-11-03 RX ORDER — HEPARIN SODIUM 1000 [USP'U]/ML
INJECTION, SOLUTION INTRAVENOUS; SUBCUTANEOUS
Status: DISCONTINUED | OUTPATIENT
Start: 2022-11-03 | End: 2022-11-03 | Stop reason: HOSPADM

## 2022-11-03 RX ORDER — ACETAMINOPHEN 325 MG/1
650 TABLET ORAL EVERY 4 HOURS PRN
Status: DISCONTINUED | OUTPATIENT
Start: 2022-11-03 | End: 2022-11-03

## 2022-11-03 RX ORDER — FENTANYL CITRATE 50 UG/ML
INJECTION, SOLUTION INTRAMUSCULAR; INTRAVENOUS
Status: DISCONTINUED | OUTPATIENT
Start: 2022-11-03 | End: 2022-11-03 | Stop reason: HOSPADM

## 2022-11-03 RX ORDER — CLOPIDOGREL BISULFATE 75 MG/1
600 TABLET ORAL ONCE
Status: COMPLETED | OUTPATIENT
Start: 2022-11-03 | End: 2022-11-03

## 2022-11-03 RX ORDER — LIDOCAINE HYDROCHLORIDE 10 MG/ML
INJECTION, SOLUTION EPIDURAL; INFILTRATION; INTRACAUDAL; PERINEURAL
Status: DISCONTINUED | OUTPATIENT
Start: 2022-11-03 | End: 2022-11-03 | Stop reason: HOSPADM

## 2022-11-03 RX ORDER — NEBIVOLOL 5 MG/1
20 TABLET ORAL
Status: DISCONTINUED | OUTPATIENT
Start: 2022-11-03 | End: 2022-11-04 | Stop reason: HOSPADM

## 2022-11-03 RX ADMIN — Medication 10 ML: at 09:01

## 2022-11-03 RX ADMIN — SODIUM CHLORIDE 60 ML/HR: 9 INJECTION, SOLUTION INTRAVENOUS at 20:27

## 2022-11-03 RX ADMIN — ROSUVASTATIN 40 MG: 20 TABLET, FILM COATED ORAL at 20:26

## 2022-11-03 RX ADMIN — HYDRALAZINE HYDROCHLORIDE 25 MG: 25 TABLET, FILM COATED ORAL at 21:22

## 2022-11-03 RX ADMIN — NEBIVOLOL 20 MG: 5 TABLET ORAL at 09:52

## 2022-11-03 RX ADMIN — ASPIRIN 81 MG: 81 TABLET, COATED ORAL at 08:55

## 2022-11-03 RX ADMIN — CLOPIDOGREL BISULFATE 600 MG: 75 TABLET ORAL at 08:59

## 2022-11-03 RX ADMIN — NITROGLYCERIN 25 MCG/MIN: 20 INJECTION INTRAVENOUS at 13:43

## 2022-11-03 RX ADMIN — Medication 10 ML: at 09:02

## 2022-11-03 NOTE — PROGRESS NOTES
"[facility] Progress Note     LOS: 1 day   Patient Care Team:  Provider, No Known as PCP - General  PCP:  Provider, No Known    Chief Complaint: Follow-up unstable angina    Subjective:   Recurrent chest pain this morning, similar to what he experienced prior to admission.  BP stable, nitroglycerin infusing    Review of Systems:   All systems have been reviewed and are negative with the exception of those mentioned above.      Objective:    Vital Sign Min/Max for last 24 hours  Temp  Min: 97.9 °F (36.6 °C)  Max: 98.3 °F (36.8 °C)   BP  Min: 128/88  Max: 166/104   Pulse  Min: 61  Max: 89   Resp  Min: 18  Max: 20   SpO2  Min: 94 %  Max: 99 %   No data recorded   No data recorded     Flowsheet Rows    Flowsheet Row First Filed Value   Admission Height 167.6 cm (66\") Documented at 11/01/2022 0919   Admission Weight 54.4 kg (120 lb) Documented at 11/01/2022 0919          Telemetry: Sinus rhythm    No intake or output data in the 24 hours ending 11/03/22 0852  Intake & Output (last 3 days)       10/31 0701  11/01 0700 11/01 0701  11/02 0700 11/02 0701  11/03 0700 11/03 0701  11/04 0700            Urine Unmeasured Occurrence   2 x            Physical Exam:  Constitutional:       Appearance: Healthy appearance. Not in distress.   Pulmonary:      Effort: Pulmonary effort is normal.   Cardiovascular:      Normal rate.      Murmurs: There is no murmur.      Comments: Right radial cath site with intact pulses no hematoma  Pulses:     Intact distal pulses.   Edema:     Peripheral edema absent.   Skin:     General: Skin is warm and dry.   Neurological:      General: No focal deficit present.       2+ PT pulses bilaterally     LABS/DIAGNOSTIC DATA:  Results from last 7 days   Lab Units 11/01/22  0934   WBC 10*3/mm3 5.36   HEMOGLOBIN g/dL 15.8   HEMATOCRIT % 49.8   PLATELETS 10*3/mm3 282     Lab Results   Lab Value Date/Time    TROPONINT 0.028 11/01/2022 1144    TROPONINT 0.030 " 11/01/2022 0934         Results from last 7 days   Lab Units 11/03/22  0243 11/02/22  0759 11/01/22  1840 11/01/22  0934   SODIUM mmol/L 140 138 139 141   POTASSIUM mmol/L 4.4 4.7 4.3 4.0   CHLORIDE mmol/L 105 101 103 103   CO2 mmol/L 25.0 24.0 27.0 27.0   BUN mg/dL 22 17 18 20   CREATININE mg/dL 1.08 1.20 1.05 1.25   CALCIUM mg/dL 8.3* 8.9 8.7 9.5   BILIRUBIN mg/dL  --   --   --  0.4   ALK PHOS U/L  --   --   --  90   ALT (SGPT) U/L  --   --   --  38   AST (SGOT) U/L  --   --   --  30   GLUCOSE mg/dL 91 95 119* 119*     Results from last 7 days   Lab Units 11/02/22  0759   HEMOGLOBIN A1C % 5.80*     Results from last 7 days   Lab Units 11/02/22  0759   CHOLESTEROL mg/dL 232*   TRIGLYCERIDES mg/dL 120   HDL CHOL mg/dL 54   LDL CHOL mg/dL 157*               Cardiac catheterization:    Left Main   The vessel is moderate in size and is angiographically normal.      Left Anterior Descending   The vessel is moderate in size. The vessel exhibits minimal luminal irregularities.      Ramus Intermedius   The vessel is moderate in size. There is a 60% tubular proximal segment stenosis that was not found to be functionally significant by IFR with a ratio of 0.92.      Left Circumflex   The vessel is large in size. There is a 70% mid segment stenosis. There is a 90% distal segment stenosis at the takeoff of the first posterior lateral branch. The first posterior lateral branch has an 80% stenosis. There is a 70% stenosis at the takeoff of the second posterior lateral branch.      Right Coronary Artery   The vessel is small in size. There is a 90% mid segment stenosis.          Echo:    Left ventricular systolic function is normal. Left ventricular ejection fraction appears to be 56 - 60%.  •  Left ventricular wall thickness is consistent with mild concentric hypertrophy.  •  The following left ventricular wall segments are hypokinetic: basal inferolateral and basal inferior.  •  Left ventricular diastolic function is consistent  with (grade I) impaired relaxation.        Medication Review:   aspirin, 81 mg, Oral, Daily  clopidogrel, 600 mg, Oral, Once  [START ON 11/4/2022] clopidogrel, 75 mg, Oral, Daily  nebivolol, 10 mg, Oral, Q24H  pharmacy consult - [place], , Does not apply, Daily  rosuvastatin, 40 mg, Oral, Nightly       nitroglycerin, 10-50 mcg/min, Last Rate: 25 mcg/min (11/02/22 1233)           Precordial pain    Coronary artery disease involving coronary bypass graft of native heart with unstable angina pectoris (HCC)    Unstable angina (HCC)      Assessment/Plan:    1.  Unstable angina  -Continue aspirin, statin  -Continue nitroglycerin drip for treatment of chest pain/hypertension.    -N.p.o. for PCI of the circumflex artery via femoral access.  Tentatively mid to late afternoon  -Saline infusion to maintain hydration while waiting for heart cath  -Total cholesterol 232 and  while on no statin medication    2.  Hypertension  -Increased Bystolic to 10 mg, monitor heart rate and BP response  -Using nitroglycerin drip for goal blood pressure 130-160 mmHg systolic    3.  Disposition  -Hopefully ready for discharge by tomorrow if PCI without complication and BP stable off of nitroglycerin    Quentin Mccall MD [facility]  11/03/22

## 2022-11-03 NOTE — PLAN OF CARE
VSS, pt resting well with no current complaints. Anticipated pci today. Will continue to monitor.  Problem: Adult Inpatient Plan of Care  Goal: Plan of Care Review  Outcome: Ongoing, Progressing  Goal: Patient-Specific Goal (Individualized)  Outcome: Ongoing, Progressing  Goal: Absence of Hospital-Acquired Illness or Injury  Outcome: Ongoing, Progressing  Intervention: Identify and Manage Fall Risk  Recent Flowsheet Documentation  Taken 11/3/2022 0600 by Rhys Soni RN  Safety Promotion/Fall Prevention: activity supervised  Taken 11/3/2022 0400 by Rhys Soni RN  Safety Promotion/Fall Prevention: activity supervised  Taken 11/3/2022 0200 by Rhys Soni RN  Safety Promotion/Fall Prevention: activity supervised  Taken 11/3/2022 0000 by Rhys Soni RN  Safety Promotion/Fall Prevention: activity supervised  Taken 11/2/2022 2200 by Rhys Soni RN  Safety Promotion/Fall Prevention: activity supervised  Taken 11/2/2022 2000 by Rhys Soni RN  Safety Promotion/Fall Prevention: activity supervised  Intervention: Prevent Skin Injury  Recent Flowsheet Documentation  Taken 11/3/2022 0600 by Rhys Soni RN  Body Position: position changed independently  Taken 11/3/2022 0400 by Rhys Soni RN  Body Position: position changed independently  Taken 11/3/2022 0200 by Rhys Soni RN  Body Position: position changed independently  Taken 11/3/2022 0000 by Rhys Soni RN  Body Position: position changed independently  Taken 11/2/2022 2200 by Rhys Soni RN  Body Position: position changed independently  Taken 11/2/2022 2000 by Rhys Soni RN  Body Position: position changed independently  Intervention: Prevent and Manage VTE (Venous Thromboembolism) Risk  Recent Flowsheet Documentation  Taken 11/3/2022 0600 by Rhys Soni RN  Activity Management: up ad asya  Taken 11/3/2022 0400 by Rhys Soni RN  Activity Management: up ad asya  Taken 11/3/2022 0200 by Rhys Soni RN  Activity Management: up ad asya  Taken 11/3/2022 0000 by  Zachariah, Rhys, RN  Activity Management: up ad asya  Taken 11/2/2022 2200 by Rhys Soni RN  Activity Management: up ad asya  Taken 11/2/2022 2000 by Rhys Soni RN  Activity Management: up ad asya  VTE Prevention/Management: sequential compression devices off  Intervention: Prevent Infection  Recent Flowsheet Documentation  Taken 11/3/2022 0600 by Rhys Soni RN  Infection Prevention: environmental surveillance performed  Taken 11/3/2022 0400 by Rhys Soni RN  Infection Prevention: environmental surveillance performed  Taken 11/3/2022 0200 by Rhys Soni RN  Infection Prevention: environmental surveillance performed  Taken 11/3/2022 0000 by Rhys Soni RN  Infection Prevention: environmental surveillance performed  Taken 11/2/2022 2200 by Rhys Soni RN  Infection Prevention: environmental surveillance performed  Taken 11/2/2022 2000 by Rhys Soni RN  Infection Prevention: environmental surveillance performed  Goal: Optimal Comfort and Wellbeing  Outcome: Ongoing, Progressing  Goal: Readiness for Transition of Care  Outcome: Ongoing, Progressing     Problem: Chest Pain  Goal: Resolution of Chest Pain Symptoms  Outcome: Ongoing, Progressing   Goal Outcome Evaluation:

## 2022-11-03 NOTE — POST-PROCEDURE NOTE
Brief postprocedural note  -The tandem 70% and 90% mid to distal circumflex lesions are status post intervention with a Xience Skypoint 4.0 x 18 mm drug-eluting stent proximally and a Xience Skypoint 3.5 x 38 mm drug-eluting stent distally.  The second OM branch was also treated with a kissing balloon inflation.  -Continue DAPT, beta-blocker therapy, high intensity statin.    -Bystolic was increased today.  Add amlodipine tomorrow if still with a systolic averaging above 150 mmHg.    -Hydralazine p.o. as needed overnight.    -Aggressive lifestyle and risk factor modification for CAD.  Cardiac rehab referral.  -Home tomorrow if clinically stable, groin site stable, and if blood pressure reasonable (less than 160/100 mmHg)  -Follow-up in the heart valve clinic if needed for additional blood pressure management in the next 1 to 2 weeks  -Follow-up with Dr. Mccall in 6 weeks    Quentin Mccall MD, MSc, FACC, [facility]  Interventional Cardiology  [facility]

## 2022-11-04 ENCOUNTER — READMISSION MANAGEMENT (OUTPATIENT)
Dept: CALL CENTER | Facility: HOSPITAL | Age: 62
End: 2022-11-04

## 2022-11-04 ENCOUNTER — TRANSCRIBE ORDERS (OUTPATIENT)
Dept: CARDIAC REHAB | Facility: HOSPITAL | Age: 62
End: 2022-11-04

## 2022-11-04 VITALS
OXYGEN SATURATION: 99 % | TEMPERATURE: 98.5 F | RESPIRATION RATE: 18 BRPM | HEIGHT: 66 IN | WEIGHT: 119.93 LBS | BODY MASS INDEX: 19.27 KG/M2 | HEART RATE: 70 BPM | DIASTOLIC BLOOD PRESSURE: 89 MMHG | SYSTOLIC BLOOD PRESSURE: 161 MMHG

## 2022-11-04 DIAGNOSIS — Z95.5 STENTED CORONARY ARTERY: Primary | ICD-10-CM

## 2022-11-04 LAB
ANION GAP SERPL CALCULATED.3IONS-SCNC: 9 MMOL/L (ref 5–15)
BUN SERPL-MCNC: 14 MG/DL (ref 8–23)
BUN/CREAT SERPL: 14.4 (ref 7–25)
CALCIUM SPEC-SCNC: 8.3 MG/DL (ref 8.6–10.5)
CHLORIDE SERPL-SCNC: 104 MMOL/L (ref 98–107)
CO2 SERPL-SCNC: 25 MMOL/L (ref 22–29)
CREAT SERPL-MCNC: 0.97 MG/DL (ref 0.76–1.27)
EGFRCR SERPLBLD CKD-EPI 2021: 88.3 ML/MIN/1.73
GLUCOSE SERPL-MCNC: 94 MG/DL (ref 65–99)
HCT VFR BLD AUTO: 44.9 % (ref 37.5–51)
HGB BLD-MCNC: 13.9 G/DL (ref 13–17.7)
PA ADP PRP-ACNC: 73 PRU
POTASSIUM SERPL-SCNC: 3.9 MMOL/L (ref 3.5–5.2)
QT INTERVAL: 474 MS
QTC INTERVAL: 465 MS
SODIUM SERPL-SCNC: 138 MMOL/L (ref 136–145)

## 2022-11-04 PROCEDURE — 93005 ELECTROCARDIOGRAM TRACING: CPT | Performed by: INTERNAL MEDICINE

## 2022-11-04 PROCEDURE — 85014 HEMATOCRIT: CPT | Performed by: INTERNAL MEDICINE

## 2022-11-04 PROCEDURE — 99238 HOSP IP/OBS DSCHRG MGMT 30/<: CPT | Performed by: HOSPITALIST

## 2022-11-04 PROCEDURE — 85018 HEMOGLOBIN: CPT | Performed by: INTERNAL MEDICINE

## 2022-11-04 PROCEDURE — 80048 BASIC METABOLIC PNL TOTAL CA: CPT | Performed by: INTERNAL MEDICINE

## 2022-11-04 PROCEDURE — 85576 BLOOD PLATELET AGGREGATION: CPT | Performed by: INTERNAL MEDICINE

## 2022-11-04 RX ORDER — ROSUVASTATIN CALCIUM 40 MG/1
40 TABLET, COATED ORAL NIGHTLY
Qty: 90 TABLET | Refills: 3 | Status: SHIPPED | OUTPATIENT
Start: 2022-11-04

## 2022-11-04 RX ORDER — CLOPIDOGREL BISULFATE 75 MG/1
75 TABLET ORAL DAILY
Qty: 90 TABLET | Refills: 3 | Status: SHIPPED | OUTPATIENT
Start: 2022-11-04

## 2022-11-04 RX ORDER — NEBIVOLOL 20 MG/1
20 TABLET ORAL
Qty: 90 TABLET | Refills: 3 | Status: SHIPPED | OUTPATIENT
Start: 2022-11-04 | End: 2022-11-04 | Stop reason: SDUPTHER

## 2022-11-04 RX ORDER — AMLODIPINE BESYLATE 5 MG/1
5 TABLET ORAL DAILY
Qty: 90 TABLET | Refills: 3 | Status: SHIPPED | OUTPATIENT
Start: 2022-11-04

## 2022-11-04 RX ORDER — NEBIVOLOL 20 MG/1
20 TABLET ORAL
Qty: 90 TABLET | Refills: 3 | Status: SHIPPED | OUTPATIENT
Start: 2022-11-04

## 2022-11-04 RX ORDER — ASPIRIN 81 MG/1
81 TABLET ORAL DAILY
Qty: 90 TABLET | Refills: 3 | Status: SHIPPED | OUTPATIENT
Start: 2022-11-04

## 2022-11-04 RX ADMIN — Medication 10 ML: at 09:20

## 2022-11-04 RX ADMIN — CLOPIDOGREL BISULFATE 75 MG: 75 TABLET ORAL at 09:19

## 2022-11-04 RX ADMIN — NEBIVOLOL 20 MG: 5 TABLET ORAL at 09:19

## 2022-11-04 RX ADMIN — ASPIRIN 81 MG: 81 TABLET, COATED ORAL at 09:19

## 2022-11-04 NOTE — PROGRESS NOTES
Pt. Referred for Phase II Cardiac Rehab. Staff discussed benefits of exercise, program protocol, and educational material provided. Teach back verified.  Patient scheduled for orientation at [facility] on Friday, November 18th at 1300.

## 2022-11-04 NOTE — OUTREACH NOTE
Prep Survey    Flowsheet Row Responses   [facility] patient discharged from? [place]   Is LACE score < 7 ? Yes   Emergency Room discharge w/ pulse ox? No   Eligibility [facility]   Date of Admission 11/01/22   Date of Discharge 11/04/22   Discharge Disposition Home or Self Care   Discharge diagnosis Unstable angina , cardiac cath X 2 with VANGIE   Does the patient have one of the following disease processes/diagnoses(primary or secondary)? Other   Does the patient have Home health ordered? No   Is there a DME ordered? No   Comments regarding appointments new PCP appt   Prep survey completed? Yes          SHARONA CARIAS - Registered Nurse

## 2022-11-04 NOTE — PLAN OF CARE
Goal Outcome Evaluation:  Plan of Care Reviewed With: patient   2000- Patient arrived back from the cath lab. Right groin site at this time was CDI with no bleeding, at 2100 pt noted with bleeding to the site, new guaze placed with 10lbs sand bag, site assess throughout the night stopped bleeding at 0300, new dressing placed to site. Pt required Hydralazine PRN at 2122. Tele monitor shows NSR. NS infusing at 60ml/hr over night.      Progress: improving

## 2022-11-04 NOTE — CASE MANAGEMENT/SOCIAL WORK
"Case Management Discharge Note      Final Note: Plan is home with spouse.  Pt has a new PCP appointment and will be in AVS, in additional to other f/u appts.  Pt was made \"Meds to Bed\" at [facility] and his Nebivolol requires a prior auth, which was initiated through Cover My Meds and still pending. All other meds will be brought to pt's room by [facility] at $0 copay.  Pt's Nebivolol sent to pt's pharmacy, [facility] [address] in [place] and a Good Rx discount coupon used until prior auth is back. Pt's cost with coupon is $32.40 and pt is aware and agreable. Pt will pick his Nebivolol up at [facility] and denies further discharge needs.         Selected Continued Care - Admitted Since 11/1/2022     Destination    No services have been selected for the patient.              Durable Medical Equipment    No services have been selected for the patient.              Dialysis/Infusion    No services have been selected for the patient.              Home Medical Care    No services have been selected for the patient.              Therapy    No services have been selected for the patient.              Community Resources    No services have been selected for the patient.              Community & DME    No services have been selected for the patient.                       Final Discharge Disposition Code: 01 - home or self-care  "

## 2022-11-04 NOTE — DISCHARGE SUMMARY
[facility]  DISCHARGE SUMMARY    Admission Date: 11/1/2022       Date of Discharge:  11/4/2022    Discharge Diagnosis: Unstable angina    Presenting Problem/History of Present Illness  Unstable angina (HCC) [I20.0]    Hospital Course  Patient is a 62 y.o. male presented to [facility] ED with complaints of exertional chest pain and dyspnea for 1 week.  Upon arrival to the ED, patient's blood pressure was significantly elevated, troponin 0.030 and EKG with T wave inversions in the lateral leads.  He was taken to the Cath Lab on 11/1/2022 and found to have severe multivessel CAD involving a large dominant circumflex and small nondominant RCA.  No intervention was performed at that time.  He did return to the Cath Lab on 11/3/2022 for PCI to distal circumflex lesions.  Status post VANGIE x2 to proximal and distal circumflex lesions, and kissing balloon inflation to the second OM branch.  Patient was started on Bystolic for hypertension.  Echocardiogram revealing normal EF, 56 to 60%, mild LVH, grade 1 diastolic dysfunction.    He is stable and ready for discharge home today.  Continue DAPT, beta-blocker, high intensity statin.  Systolic blood pressure remains elevated despite increase in Bystolic yesterday.  Adding amlodipine 5 mg daily for blood pressure control at discharge.  Patient will need follow-up in heart valve clinic in 1 to 2 weeks for additional blood pressure management.  He will also need follow-up with Dr. Mccall in 6 weeks in the cardiology clinic.    Consults:   Consults       Date and Time Order Name Status Description    11/1/2022 10:54 AM Cardiology (on-call MD unless specified) Completed             Pertinent Test/Procedure Results:   [facility] 11/01/2022    There is severe multivessel CAD involving a large dominant circumflex and a small nondominant RCA.    [facility] 11/03/2022    The tandem 70% and 90% mid to distal circumflex lesions are status post intervention with a Xience Skypoint 4.0  x 18 mm drug-eluting stent proximally and a Xience Skypoint 3.5 x 38 mm drug-eluting stent distally.  The second OM branch was also treated with a kissing balloon inflation.    TTE 11/01/2022    Left ventricular systolic function is normal. Left ventricular ejection fraction appears to be 56 - 60%.    Left ventricular wall thickness is consistent with mild concentric hypertrophy.    The following left ventricular wall segments are hypokinetic: basal inferolateral and basal inferior.    Left ventricular diastolic function is consistent with (grade I) impaired relaxation.      Condition on Discharge:  Stable    Physical Exam at Discharge  Vital Signs  Temp:  [97.9 °F (36.6 °C)-98.5 °F (36.9 °C)] 98.5 °F (36.9 °C)  Heart Rate:  [59-79] 70  Resp:  [17-20] 18  BP: (120-174)/() 161/89  Physical Exam: Performed by my partner, Dr. Ruiz, on day of discharge.   GEN: NAD  PULM: Regular, unlabored  CV: Regular rate and rhythm    Discharge Disposition: Home    Discharge Diet: Cardiac heart healthy diet    Activity at Discharge: As tolerated    Follow-up Appointments  Future Appointments   Date Time Provider Department Center   11/8/2022  2:45 PM Brielle Tinoco APRN [facility]   11/10/2022  1:00 PM Brandi Garcia APRN [facility]   11/18/2022  1:00 PM ORIENTATION - [facility] CARD REHAB [facility]   12/7/2022  2:30 PM Vicenta Knight APRN [facility]     Additional Instructions for the Follow-ups that You Need to Schedule       Ambulatory Referral to Cardiac Rehab   As directed      Discharge Follow-up with Specialty: Follow up in heart and valve clinic in 1-2 weeks for blood pressure management   As directed      Specialty: Follow up in heart and valve clinic in 1-2 weeks for blood pressure management         Discharge Follow-up with Specialty: Follow up with Dr. Mccall in 6 weeks   As directed      Specialty: Follow up with Dr. Mccall in 6 weeks                 Discharge Medications     Discharge  Medications        New Medications        Instructions Start Date   amLODIPine 5 MG tablet  Commonly known as: NORVASC   5 mg, Oral, Daily      Aspirin Low Dose 81 MG EC tablet  Generic drug: aspirin   81 mg, Oral, Daily      clopidogrel 75 MG tablet  Commonly known as: PLAVIX   75 mg, Oral, Daily      nebivolol 20 MG tablet  Commonly known as: BYSTOLIC   20 mg, Oral, Every 24 Hours Scheduled      rosuvastatin 40 MG tablet  Commonly known as: CRESTOR   40 mg, Oral, Nightly                Quentin Mccall MD  11/04/22  12:28 EDT    Time: I spent 20 minutes on this discharge activity which included: face-to-face encounter with the patient, reviewing the data in the system, coordination of the care with the nursing staff as well as consultants, documentation, and entering orders.    Electronically signed by JUSTYN Valladares, 11/04/22, 9:13 AM EDT.

## 2022-11-07 ENCOUNTER — TRANSITIONAL CARE MANAGEMENT TELEPHONE ENCOUNTER (OUTPATIENT)
Dept: CALL CENTER | Facility: HOSPITAL | Age: 62
End: 2022-11-07

## 2022-11-07 NOTE — OUTREACH NOTE
Call Center TCM Note    Flowsheet Row Responses   [facility] patient discharged from? [place]   Does the patient have one of the following disease processes/diagnoses(primary or secondary)? Other   TCM attempt successful? Yes   Call start time 0841   Call end time 0843   Discharge diagnosis Unstable angina , cardiac cath X 2 with VANGIE   Meds reviewed with patient/caregiver? Yes   Is the patient having any side effects they believe may be caused by any medication additions or changes? No   Does the patient have all medications ordered at discharge? Yes   Is the patient taking all medications as directed (includes completed medication regime)? Yes   Comments PCP APPOINTMENT IS TOMORROW   Does the patient have an appointment with their PCP within 7 days of discharge? Yes   Has home health visited the patient within 72 hours of discharge? N/A   Psychosocial issues? No   Did the patient receive a copy of their discharge instructions? Yes   Nursing interventions Reviewed instructions with patient   What is the patient's perception of their health status since discharge? Improving   Is the patient/caregiver able to teach back signs and symptoms related to disease process for when to call PCP? Yes   Is the patient/caregiver able to teach back signs and symptoms related to disease process for when to call 911? Yes   Is the patient/caregiver able to teach back the hierarchy of who to call/visit for symptoms/problems? PCP, Specialist, Home health nurse, Urgent Care, ED, 911 Yes   If the patient is a current smoker, are they able to teach back resources for cessation? Not a smoker   TCM call completed? Yes   Call end time 0843   Would this patient benefit from a Referral to Amb Social Work? No   Is the patient interested in additional calls from an ambulatory ?  NOTE:  applies to high risk patients requiring additional follow-up. No          Tri Euceda LPN    11/7/2022, 08:45 EST

## 2022-11-08 ENCOUNTER — OFFICE VISIT (OUTPATIENT)
Dept: INTERNAL MEDICINE | Facility: CLINIC | Age: 62
End: 2022-11-08

## 2022-11-08 VITALS
HEIGHT: 66 IN | TEMPERATURE: 97 F | HEART RATE: 66 BPM | BODY MASS INDEX: 24.91 KG/M2 | SYSTOLIC BLOOD PRESSURE: 136 MMHG | WEIGHT: 155 LBS | OXYGEN SATURATION: 98 % | DIASTOLIC BLOOD PRESSURE: 72 MMHG

## 2022-11-08 DIAGNOSIS — I10 HYPERTENSION, UNSPECIFIED TYPE: ICD-10-CM

## 2022-11-08 DIAGNOSIS — I20.0 UNSTABLE ANGINA: ICD-10-CM

## 2022-11-08 DIAGNOSIS — Z95.9 S/P ARTERIAL STENT: ICD-10-CM

## 2022-11-08 DIAGNOSIS — E78.5 HYPERLIPIDEMIA, UNSPECIFIED HYPERLIPIDEMIA TYPE: ICD-10-CM

## 2022-11-08 DIAGNOSIS — I25.700 CORONARY ARTERY DISEASE INVOLVING CORONARY BYPASS GRAFT OF NATIVE HEART WITH UNSTABLE ANGINA PECTORIS: ICD-10-CM

## 2022-11-08 DIAGNOSIS — M25.552 LEFT HIP PAIN: ICD-10-CM

## 2022-11-08 DIAGNOSIS — Z09 HOSPITAL DISCHARGE FOLLOW-UP: Primary | ICD-10-CM

## 2022-11-08 PROCEDURE — 99214 OFFICE O/P EST MOD 30 MIN: CPT | Performed by: NURSE PRACTITIONER

## 2022-11-09 NOTE — PROGRESS NOTES
"[facility]  Heart and Valve Center    Chief Complaint  Congestive Heart Failure and Establish Care    Subjective    History of Present Illness {CC  Problem List  Visit  Diagnosis   Encounters  Notes  Medications  Labs  Result Review Imaging  Media :23}     Nik Jones is a 62 y.o. male with HTN who presents today as a hospital follow up for HTN, CAD.     Patient admitted 11/1 for unstable angina and hypertensive urgency.  He underwent left heart catheterization with Dr. Mccall and found to have severe multivessel CAD.  No intervention was performed at that time.  He did return to the Cath Lab on 11/3 for PCI to the distal circumflex lesions.  Echo showed normal LVEF, mild LVH and grade 1 diastolic dysfunction.  He was started on Bystolic.  Systolic blood pressure remained elevated and amlodipine was added later.    He reports that he is doing very well.  He is not for sure about his home blood pressure readings with believes most of them are around 130s or less.  Denies any chest pain, shortness of breath, lower extreme edema, palpitations, dizziness or lightheadedness          Objective     Vital Signs:   Vitals:    11/10/22 1300 11/10/22 1301 11/10/22 1303   BP: 137/72 118/68 131/75   BP Location: Right arm Left arm Left arm   Patient Position: Sitting Standing Sitting   Cuff Size: Adult Adult Adult   Pulse: 66 67 66   Resp:   17   Temp:   96.9 °F (36.1 °C)   TempSrc:   Temporal   SpO2: 97% 95% 96%   Weight:   70.1 kg (154 lb 8 oz)   Height:   167.6 cm (66\")     Body mass index is 24.94 kg/m².  Physical Exam  Vitals reviewed.   Constitutional:       Appearance: Normal appearance.   HENT:      Head: Normocephalic.   Neck:      Vascular: No carotid bruit.   Cardiovascular:      Rate and Rhythm: Normal rate and regular rhythm.      Pulses: Normal pulses.      Heart sounds: Normal heart sounds, S1 normal and S2 normal. No murmur heard.  Pulmonary:      Effort: Pulmonary effort is " normal. No respiratory distress.      Breath sounds: Normal breath sounds.   Chest:      Chest wall: No tenderness.   Abdominal:      General: Abdomen is flat.      Palpations: Abdomen is soft.   Musculoskeletal:      Cervical back: Neck supple.      Right lower leg: No edema.      Left lower leg: No edema.   Skin:     General: Skin is warm and dry.   Neurological:      General: No focal deficit present.      Mental Status: He is alert and oriented to person, place, and time. Mental status is at baseline.   Psychiatric:         Mood and Affect: Mood normal.         Behavior: Behavior normal.         Thought Content: Thought content normal.              Result Review  Data Reviewed:{ Labs  Result Review  Imaging  Med Tab  Media :23}   Cardiac Catheterization/Vascular Study (11/01/2022 15:42)  Cardiac Catheterization/Vascular Study (11/03/2022 19:28)  Adult Transthoracic Echo Complete w/ Color, Spectral and Contrast if Necessary Per Protocol (11/01/2022 13:45)  P2Y12 Platelet Inhibition (11/04/2022 02:33)  Basic Metabolic Panel (11/04/2022 02:33)  Lipid Panel (11/02/2022 07:59)  Hemoglobin A1c (11/02/2022 07:59)  BNP (11/01/2022 09:34)    Hospital notes reviewed             Assessment and Plan {CC Problem List  Visit Diagnosis  ROS  Review (Popup)  Health Maintenance  Quality  BestPractice  Medications  SmartSets  SnapShot Encounters  Media :23}   1. Coronary artery disease involving native coronary artery of native heart without angina pectoris  Status post PCI to the distal circumflex lesions.  He does have RCA lesion but artery small nondominant  Discussed the importance of continuing DAPT, statin  Encouraged cardiac rehab  Encouraged regular exercise and cardiac diet  Discussed signs and symptoms of angina.  Sent in a prescription for nitroglycerin as needed    2. Primary hypertension  Appears to be well controlled.  [facility]  For now, he prefers to continue  current medications.  I advised him to call if this becomes financially unfeasible.  Continue regular monitoring  Discussed goal blood pressure of 130/80 or less    3. Hyperlipidemia, unspecified hyperlipidemia type  Discussed the importance of statin therapy  Continue rosuvastatin  Would have levels rechecked with PCP in 6 weeks  Encouraged healthy diet    Keep upcoming appt with Vicenta ALEJANDRA      Follow Up {Instructions Charge Capture  Follow-up Communications :23}   No follow-ups on file.    Patient was given instructions and counseling regarding his condition or for health maintenance advice. Please see specific information pulled into the AVS if appropriate.  Advised to call the Heart and Valve Center with any questions, concerns, or worsening symptoms.

## 2022-11-10 ENCOUNTER — OFFICE VISIT (OUTPATIENT)
Dept: CARDIOLOGY | Facility: HOSPITAL | Age: 62
End: 2022-11-10

## 2022-11-10 VITALS
WEIGHT: 154.5 LBS | RESPIRATION RATE: 17 BRPM | BODY MASS INDEX: 24.83 KG/M2 | TEMPERATURE: 96.9 F | DIASTOLIC BLOOD PRESSURE: 75 MMHG | HEART RATE: 66 BPM | OXYGEN SATURATION: 96 % | SYSTOLIC BLOOD PRESSURE: 131 MMHG | HEIGHT: 66 IN

## 2022-11-10 DIAGNOSIS — I25.10 CORONARY ARTERY DISEASE INVOLVING NATIVE CORONARY ARTERY OF NATIVE HEART WITHOUT ANGINA PECTORIS: Primary | ICD-10-CM

## 2022-11-10 DIAGNOSIS — I10 PRIMARY HYPERTENSION: ICD-10-CM

## 2022-11-10 DIAGNOSIS — E78.5 HYPERLIPIDEMIA, UNSPECIFIED HYPERLIPIDEMIA TYPE: ICD-10-CM

## 2022-11-10 PROCEDURE — 99214 OFFICE O/P EST MOD 30 MIN: CPT | Performed by: NURSE PRACTITIONER

## 2022-11-10 RX ORDER — NITROGLYCERIN 0.4 MG/1
TABLET SUBLINGUAL
Qty: 25 TABLET | Refills: 0 | Status: SHIPPED | OUTPATIENT
Start: 2022-11-10

## 2022-11-10 NOTE — PROGRESS NOTES
Transitional Care Follow Up Visit  Subjective     Nik Marcelo Jones is a 62 y.o. male who presents for a transitional care management visit.    Within 48 business hours after discharge our office contacted him via telephone to coordinate his care and needs.      I reviewed and discussed the details of that call along with the discharge summary, hospital problems, inpatient lab results, inpatient diagnostic studies, and consultation reports with Nik.     Current outpatient and discharge medications have been reconciled for the patient.  Reviewed by: JUSTYN Pardo      Date of TCM Phone Call 11/4/2022   [facility]   Date of Admission 11/1/2022   Date of Discharge 11/4/2022   Discharge Disposition Home or Self Care     Risk for Readmission (LACE) Score: 6 (11/4/2022  6:00 AM)      History of Present Illness  Course During Hospital Stay: Patient was admitted at [facility] from 11/1/2022 through 11/4/2022.  Patient was admitted for unstable angina, hypertension, and T wave inversion noted on EKG.  Patient underwent the first heart cath on 11/1 which noted severe multivessel CAD of large dominant circumflex and small nondominant RCA.  No intervention was done at that time.  Second heart cath was on 11/3 where he had VANGIE placement x2 to the proximal and distal circumflex lesions.  He was also noted to have left ventricular hypertrophy.  EF on echo was 56 to 60% with grade 1 dysfunction.  Patient was discharged home on Bystolic, Dapto, and rosuvastatin.  Patient has upcoming appointment with cardiology, Dr. Mccall, and plans for cardiac rehab.     The following portions of the patient's history were reviewed and updated as appropriate: allergies, current medications, past family history, past medical history, past social history, past surgical history, and problem list.    Review of Systems   Musculoskeletal:        Left hip pain       Objective   Vitals:    11/08/22 1446   BP:  136/72   Pulse: 66   Temp: 97 °F (36.1 °C)   SpO2: 98%     Body mass index is 25.02 kg/m².     Physical Exam  Constitutional:       Appearance: Normal appearance.   HENT:      Head: Normocephalic and atraumatic.      Nose: Nose normal.   Eyes:      Extraocular Movements: Extraocular movements intact.      Conjunctiva/sclera: Conjunctivae normal.      Pupils: Pupils are equal, round, and reactive to light.   Cardiovascular:      Rate and Rhythm: Normal rate and regular rhythm.      Heart sounds: Normal heart sounds.   Pulmonary:      Effort: Pulmonary effort is normal.      Breath sounds: Normal breath sounds.   Musculoskeletal:      Cervical back: Normal range of motion and neck supple.      Comments: Range of motion decreased to the left hip, decreased flexion and extension   Skin:     General: Skin is warm and dry.   Neurological:      General: No focal deficit present.      Mental Status: He is alert and oriented to person, place, and time. Mental status is at baseline.   Psychiatric:         Mood and Affect: Mood normal.         Behavior: Behavior normal.         Thought Content: Thought content normal.         Judgment: Judgment normal.         Assessment & Plan   Diagnoses and all orders for this visit:    1. Hospital discharge follow-up (Primary)    2. Unstable angina (HCC)    3. S/P arterial stent    4. Hypertension, unspecified type    5. Hyperlipidemia, unspecified hyperlipidemia type    6. Coronary artery disease involving coronary bypass graft of native heart with unstable angina pectoris (HCC)    7. Left hip pain  -     XR Hip With or Without Pelvis 2 - 3 View Left; Future    Plan:  Blood pressure is well controlled in the office today.  Continue with all current medications.  Keep upcoming appointment with cardiology on 11/10.  Keep upcoming appointment with Dr. Mccall on 12/7.  Keep appointment for cardiac rehab.  Will do x-ray of left hip.  Hospital labs reviewed with patient.  Will follow-up with  patient in 3 months to recheck cholesterol.  Return to clinic sooner if needed.         Return in about 3 months (around 2/8/2023).    JUSTYN Pardo    Part of this note may be an electronic transcription/translation of spoken language to printed text using the Dragon Dictation System.

## 2022-11-18 ENCOUNTER — TREATMENT (OUTPATIENT)
Dept: CARDIAC REHAB | Facility: HOSPITAL | Age: 62
End: 2022-11-18

## 2022-11-18 DIAGNOSIS — Z95.5 STENTED CORONARY ARTERY: ICD-10-CM

## 2022-11-18 PROCEDURE — 93798 PHYS/QHP OP CAR RHAB W/ECG: CPT

## 2022-12-02 ENCOUNTER — HOSPITAL ENCOUNTER (OUTPATIENT)
Dept: GENERAL RADIOLOGY | Facility: HOSPITAL | Age: 62
Discharge: HOME | End: 2022-12-02
Admitting: NURSE PRACTITIONER

## 2022-12-02 ENCOUNTER — TREATMENT (OUTPATIENT)
Dept: CARDIAC REHAB | Facility: HOSPITAL | Age: 62
End: 2022-12-02
Payer: MEDICAID

## 2022-12-02 DIAGNOSIS — M25.552 LEFT HIP PAIN: ICD-10-CM

## 2022-12-02 DIAGNOSIS — Z95.5 STENTED CORONARY ARTERY: Primary | ICD-10-CM

## 2022-12-02 PROCEDURE — 93798 PHYS/QHP OP CAR RHAB W/ECG: CPT

## 2022-12-02 PROCEDURE — 73502 X-RAY EXAM HIP UNI 2-3 VIEWS: CPT

## 2022-12-05 ENCOUNTER — TREATMENT (OUTPATIENT)
Dept: CARDIAC REHAB | Facility: HOSPITAL | Age: 62
End: 2022-12-05
Payer: MEDICAID

## 2022-12-05 ENCOUNTER — OFFICE VISIT (OUTPATIENT)
Dept: CARDIOLOGY | Facility: CLINIC | Age: 62
End: 2022-12-05

## 2022-12-05 VITALS
HEIGHT: 66 IN | BODY MASS INDEX: 24.94 KG/M2 | DIASTOLIC BLOOD PRESSURE: 70 MMHG | SYSTOLIC BLOOD PRESSURE: 110 MMHG | WEIGHT: 155.2 LBS | OXYGEN SATURATION: 98 % | HEART RATE: 52 BPM

## 2022-12-05 DIAGNOSIS — E78.5 HYPERLIPIDEMIA, UNSPECIFIED HYPERLIPIDEMIA TYPE: ICD-10-CM

## 2022-12-05 DIAGNOSIS — Z95.5 STENTED CORONARY ARTERY: Primary | ICD-10-CM

## 2022-12-05 DIAGNOSIS — I25.10 CORONARY ARTERY DISEASE INVOLVING NATIVE CORONARY ARTERY OF NATIVE HEART WITHOUT ANGINA PECTORIS: Primary | ICD-10-CM

## 2022-12-05 DIAGNOSIS — I10 PRIMARY HYPERTENSION: ICD-10-CM

## 2022-12-05 PROCEDURE — 99214 OFFICE O/P EST MOD 30 MIN: CPT | Performed by: INTERNAL MEDICINE

## 2022-12-05 PROCEDURE — 93798 PHYS/QHP OP CAR RHAB W/ECG: CPT

## 2022-12-05 NOTE — PROGRESS NOTES
[facility]  [address], [address]  [place], [place], [address]    [phone]  [email or web address]           OUTPATIENT CLINIC FOLLOW UP NOTE    Patient care team:  Patient Care Team:  Brielle Tinoco APRN as PCP - General (Internal Medicine)      Subjective:   Chief complaint:   Chief Complaint   Patient presents with   • Coronary artery disease involving native coronary artery of       HPI:    Nik Jones is a 62 y.o. male.  Cardiac focused problem list:  1. CAD   a. [facility] 11/01/2022:  Severe multivessel CAD involving a large dominant circumflex and a small nondominant RCA.  b. Echocardiogram 11/01/2022:  LVEF 56-60%. Mild LVH. Hypokinetic wall segments including basal infereolateral and basal inferior. Grade I impaired relaxation.  c. [facility] 11/03/2022: Tandem 70 and 90% mid to distal circumflex lesions status post VANGIE x 2 distally.  Second OM branch was also treated with kissing balloon inflation.   2. Hypertension     Patient presents today for hospital follow up.     Doing well.  Denies chest pain, dyspnea, palpitation    Review of Systems:  As noted above in the HPI    PFSH:  Patient Active Problem List   Diagnosis   • Precordial pain   • Unstable angina (HCC)   • Coronary artery disease involving coronary bypass graft of native heart with unstable angina pectoris (HCC)   • Hypertension   • Hyperlipidemia         Current Outpatient Medications:   •  amLODIPine (NORVASC) 5 MG tablet, Take 1 tablet by mouth Daily., Disp: 90 tablet, Rfl: 3  •  aspirin 81 MG EC tablet, Take 1 tablet by mouth Daily., Disp: 90 tablet, Rfl: 3  •  clopidogrel (PLAVIX) 75 MG tablet, Take 1 tablet by mouth Daily., Disp: 90 tablet, Rfl: 3  •  nebivolol (BYSTOLIC) 20 MG tablet, Take 1 tablet by mouth Daily., Disp: 90 tablet, Rfl: 3  •  nitroglycerin (NITROSTAT) 0.4 MG SL tablet, 1 under the tongue as needed for angina, may repeat q5mins for up three doses, Disp: 25 tablet, Rfl: 0  •  rosuvastatin  "(CRESTOR) 40 MG tablet, Take 1 tablet by mouth Every Night., Disp: 90 tablet, Rfl: 3    No Known Allergies    Social History     Socioeconomic History   • Marital status:    Tobacco Use   • Smoking status: Never   • Smokeless tobacco: Never   Vaping Use   • Vaping Use: Never used   Substance and Sexual Activity   • Alcohol use: Defer   • Drug use: Never   • Sexual activity: Yes     Partners: Female         Objective:   Physical Exam:  /70 (BP Location: Right arm, Patient Position: Sitting)   Pulse 52   Ht 167.6 cm (65.98\")   Wt 70.4 kg (155 lb 3.2 oz)   SpO2 98%   BMI 25.06 kg/m²   CONSTITUTIONAL: No acute distress  RESPIRATORY: Normal effort. Clear to auscultation bilaterally without wheezing or rales  CARDIOVASCULAR: Regular rate and rhythm with normal S1 and S2. Without murmur.  PERIPHERAL VASCULAR: Normal radial pulse.       Labs:  Labs reviewed by myself      Lab Results   Component Value Date    CHOL 232 (H) 11/02/2022     Lab Results   Component Value Date    TRIG 120 11/02/2022     Lab Results   Component Value Date    HDL 54 11/02/2022     Lab Results   Component Value Date     (H) 11/02/2022     No components found for: LDLDIRECTC    Diagnostic Data:    Procedures    TTE 11/01/2022  •  Left ventricular systolic function is normal. Left ventricular ejection fraction appears to be 56 - 60%.  •  Left ventricular wall thickness is consistent with mild concentric hypertrophy.  •  The following left ventricular wall segments are hypokinetic: basal inferolateral and basal inferior.  •  Left ventricular diastolic function is consistent with (grade I) impaired relaxation.    [facility] 11/01/2022  •  There is severe multivessel CAD involving a large dominant circumflex and a small nondominant RCA.    [facility] 11/03/2022  •  The tandem 70% and 90% mid to distal circumflex lesions are status post intervention with a Xience Skypoint 4.0 x 18 mm drug-eluting stent proximally and a Xience Skypoint 3.5 x 38 " mm drug-eluting stent distally.  The second OM branch was also treated with a kissing balloon inflation.      Assessment and Plan:     Coronary artery disease involving native coronary artery of native heart   Primary hypertension  Hyperlipidemia  -Decrease Bystolic to 10 mg daily due to relative hypotension and bradycardia  -Continue other current medications  -Continue cardiac rehab and other lifestyle modification    - Return in about 6 months (around 6/5/2023) for Next scheduled follow up, or sooner if needed.    Quentin Mccall MD, MSc, FACC, [facility]  Interventional Cardiology  [facility]

## 2022-12-07 DIAGNOSIS — M25.552 LEFT HIP PAIN: Primary | ICD-10-CM

## 2022-12-16 ENCOUNTER — TELEPHONE (OUTPATIENT)
Dept: CARDIAC REHAB | Facility: HOSPITAL | Age: 62
End: 2022-12-16

## 2022-12-16 ENCOUNTER — OFFICE VISIT (OUTPATIENT)
Dept: ORTHOPEDIC SURGERY | Facility: CLINIC | Age: 62
End: 2022-12-16

## 2022-12-16 VITALS
BODY MASS INDEX: 24.23 KG/M2 | WEIGHT: 150.8 LBS | HEIGHT: 66 IN | SYSTOLIC BLOOD PRESSURE: 138 MMHG | DIASTOLIC BLOOD PRESSURE: 80 MMHG

## 2022-12-16 DIAGNOSIS — I25.700 CORONARY ARTERY DISEASE INVOLVING CORONARY BYPASS GRAFT OF NATIVE HEART WITH UNSTABLE ANGINA PECTORIS: ICD-10-CM

## 2022-12-16 DIAGNOSIS — M16.12 PRIMARY OSTEOARTHRITIS OF LEFT HIP: Primary | ICD-10-CM

## 2022-12-16 PROCEDURE — 99204 OFFICE O/P NEW MOD 45 MIN: CPT | Performed by: STUDENT IN AN ORGANIZED HEALTH CARE EDUCATION/TRAINING PROGRAM

## 2022-12-16 RX ORDER — MELOXICAM 15 MG/1
15 TABLET ORAL DAILY
Qty: 30 TABLET | Refills: 1 | Status: SHIPPED | OUTPATIENT
Start: 2022-12-16 | End: 2023-02-18

## 2022-12-16 NOTE — PROGRESS NOTES
[facility]     Office Visit       Date: 12/16/2022   Patient Name: Nik Jones  MRN: [id number]  YOB: 1960    Referring Physician: Brielle Tinoco APRN     Chief Complaint:   Chief Complaint   Patient presents with   • Left Hip - Pain       History of Present Illness:   Nik Jones is a 62 y.o. male who presents with left hip pain for 5 year(s). Onset atraumatic and gradual in nature. Pain is localized to anterior hip/groin and is a 5/10 on the pain scale.Pain is described as aching. Associated symptoms include pain and giving way/buckling.  The pain is worse with walking, standing, sitting, climbing stairs, sleeping, working, leisure, lying on affected side, rising from seated position and any movement of the joint; resting, sitting, pain medication and/or NSAID and lying down improve the pain. Previous treatments have included: NSAIDS for 3 months duration or longer. Although some transient relief was reported with these interventions, these conservative measures have failed and symptoms have persisted. The patient is limited in daily activities and has had a significant decrease in quality of life as a result. He denies fevers, chills, or constitutional symptoms.    Subjective   Review of Systems: Review of Systems   Constitutional: Negative for chills, fever, unexpected weight gain and unexpected weight loss.   HENT: Negative for congestion, postnasal drip and rhinorrhea.    Eyes: Negative for blurred vision.   Respiratory: Negative for shortness of breath.    Cardiovascular: Negative for leg swelling.   Gastrointestinal: Negative for abdominal pain, nausea and vomiting.   Genitourinary: Negative for difficulty urinating.   Musculoskeletal: Positive for arthralgias. Negative for gait problem, joint swelling and myalgias.   Skin: Negative for skin lesions and wound.   Neurological: Negative for dizziness, weakness,  light-headedness and numbness.   Hematological: Does not bruise/bleed easily.   Psychiatric/Behavioral: Negative for depressed mood.        I have reviewed the following portions of the patient's history:History of Present Illness and review of systems.    Past Medical History:   Past Medical History:   Diagnosis Date   • Heart failure (HCC)    • HTN (hypertension)    • Hyperlipemia        Past Surgical History:   Past Surgical History:   Procedure Laterality Date   • CARDIAC CATHETERIZATION N/A 11/1/2022    Procedure: Left Heart Cath;  Surgeon: Quentin Mccall MD;  [facility];  Service: Cardiology;  Laterality: N/A;   • CARDIAC CATHETERIZATION N/A 11/3/2022    Procedure: Percutaneous Coronary Intervention - PCI to Cx;  Surgeon: Quentin Mccall MD;  Location:  [facility];  Service: Cardiology;  Laterality: N/A;       Family History:   Family History   Adopted: Yes   Problem Relation Age of Onset   • Hypertension Sister    • Hypertension Brother    • Coronary artery disease Brother 60       Social History:   Social History     Socioeconomic History   • Marital status:    Tobacco Use   • Smoking status: Never   • Smokeless tobacco: Never   Vaping Use   • Vaping Use: Never used   Substance and Sexual Activity   • Alcohol use: Defer   • Drug use: Never   • Sexual activity: Yes     Partners: Female       Medications:   Current Outpatient Medications:   •  amLODIPine (NORVASC) 5 MG tablet, Take 1 tablet by mouth Daily., Disp: 90 tablet, Rfl: 3  •  aspirin 81 MG EC tablet, Take 1 tablet by mouth Daily., Disp: 90 tablet, Rfl: 3  •  clopidogrel (PLAVIX) 75 MG tablet, Take 1 tablet by mouth Daily., Disp: 90 tablet, Rfl: 3  •  nebivolol (BYSTOLIC) 20 MG tablet, Take 1 tablet by mouth Daily., Disp: 90 tablet, Rfl: 3  •  nitroglycerin (NITROSTAT) 0.4 MG SL tablet, 1 under the tongue as needed for angina, may repeat q5mins for up three doses, Disp: 25 tablet, Rfl: 0  •   "rosuvastatin (CRESTOR) 40 MG tablet, Take 1 tablet by mouth Every Night., Disp: 90 tablet, Rfl: 3    Allergies: No Known Allergies    I reviewed the patient's chief complaint, history of present illness, review of systems, past medical history, surgical history, family history, social history, medications and allergy list.     Objective    Vital Signs:   Vitals:    12/16/22 1312   BP: 138/80   Weight: 68.4 kg (150 lb 12.8 oz)   Height: 167.6 cm (65.98\")     Body mass index is 24.35 kg/m².   BMI is within normal parameters. No other follow-up for BMI required.     Patient reports that she is a non-smoker and has not ever been a smoker.  This behavior was applauded and she was encouraged to continue in smoking cessation.  We will continue to monitor at subsequent visits.    Ortho Exam:  Constitutional: General Appearance: healthy-appearing, NAD, and normal body habitus.   Psychiatric: Orientation: oriented to time, place, and person. Mood and Affect: normal affect and mood and active and alert.   Gait and Station: Appearance: antalgic gait.   Cardiovascular System: Arterial Pulses Right: dorsalis pedis pulse normal. Varicosities Right: capillary refill test normal.   Lumbar Spine: Inspection: normal alignment.   Hip/Pelvis Appearance: Inspection: normal axial alignment.   Hips: Soft Tissue Palpation Left: tenderness of the hip flexor muscles. Active Range of Motion Left: limited (secondary to pain especially flexion and rotation). Strength Right: normal 5/5. Strength Left: normal 5/5.   Skin: Right Lower Extremity: normal. Left Lower Extremity: normal.   Neurologic: Sensation on the Right: L1 normal, L2 normal, L3 normal, L4 normal, and S2 normal. Sensation on the Left: L1 normal, L2 normal, L3 normal, L4 normal, and S2 normal.    Results Review:   Imaging Results (Last 24 Hours)     ** No results found for the last 24 hours. **      XR Hip With or Without Pelvis 2 - 3 View Left (12/02/2022 11:46)  I personally " reviewed the radiographs of the left hip.  No acute fracture or dislocation.  There is severe bone-on-bone osteoarthritis with subchondral cyst and sclerosis.    Procedures    Assessment / Plan    Assessment/Plan:   Diagnoses and all orders for this visit:    1. Primary osteoarthritis of left hip (Primary)    2. Coronary artery disease involving coronary bypass graft of native heart with unstable angina pectoris (HCC)      left Hip Osteoarthritis.  Multiple years of intermittent anterior hip pain that is worsened over recent months.  Radiographs show bone-on-bone degenerative changes including subchondral cyst formation.  Findings and suspected diagnosis were discussed. Treatment options include nonop treatment in the form of NSAIDs, PT, intraarticular injections. The natural history of slow progression was conveyed.  Patient voices that he does not want any intra-articular hip injections.  He wants to consider total hip arthroplasty.  He will discuss this with his family and then let me know in the coming weeks.  I would then place a referral.  He is a non-smoker.  His BMI is less than 25.  He does have a history of coronary artery disease and recently had a stent placed.  I do plan to start him today on meloxicam for pain control.  I did discuss with him the bleeding risks associated with his ongoing Plavix and aspirin use.  I believe this to be a temporary measure prior to surgery.  We will see him back on an as-needed basis.    Previous documentation reviewed: JUSTYN Pardo office visit on 11/8/2022.    Previous labs reviewed: 11/4/2022-BMP-creatinine 0.97, EGFR 88.3.  11-22-hemoglobin A1c 5.80%.    Follow Up:   No follow-ups on file.      Zay Calzada MD  [facility]

## 2022-12-16 NOTE — TELEPHONE ENCOUNTER
Staff called patient in regards to Phase II Cardiac rehab. Patient states that he is having trouble with his hips and has an appointment to figure out what is wrong. Patient also states that he will call back when he finds out more information about hip.

## 2022-12-29 ENCOUNTER — TELEPHONE (OUTPATIENT)
Dept: CARDIAC REHAB | Facility: HOSPITAL | Age: 62
End: 2022-12-29

## 2022-12-29 NOTE — TELEPHONE ENCOUNTER
Pt. Is going to schedule an appointment to get his hip replaced. Staff to Discharge pt from the program.

## 2022-12-30 ENCOUNTER — APPOINTMENT (OUTPATIENT)
Dept: CARDIAC REHAB | Facility: HOSPITAL | Age: 62
End: 2022-12-30
Payer: MEDICAID

## 2023-02-18 ENCOUNTER — OFFICE VISIT (OUTPATIENT)
Dept: INTERNAL MEDICINE | Facility: CLINIC | Age: 63
End: 2023-02-18
Payer: MEDICAID

## 2023-02-18 VITALS
DIASTOLIC BLOOD PRESSURE: 80 MMHG | SYSTOLIC BLOOD PRESSURE: 128 MMHG | HEIGHT: 66 IN | HEART RATE: 60 BPM | BODY MASS INDEX: 25.23 KG/M2 | OXYGEN SATURATION: 97 % | WEIGHT: 157 LBS | TEMPERATURE: 96.3 F

## 2023-02-18 DIAGNOSIS — Z09 FOLLOW-UP EXAM: Primary | ICD-10-CM

## 2023-02-18 DIAGNOSIS — I10 PRIMARY HYPERTENSION: ICD-10-CM

## 2023-02-18 DIAGNOSIS — E78.5 HYPERLIPIDEMIA, UNSPECIFIED HYPERLIPIDEMIA TYPE: ICD-10-CM

## 2023-02-18 DIAGNOSIS — I25.700 CORONARY ARTERY DISEASE INVOLVING CORONARY BYPASS GRAFT OF NATIVE HEART WITH UNSTABLE ANGINA PECTORIS: ICD-10-CM

## 2023-02-18 PROCEDURE — 99214 OFFICE O/P EST MOD 30 MIN: CPT | Performed by: NURSE PRACTITIONER

## 2023-02-23 NOTE — PROGRESS NOTES
Office Note     Name: Nik Jones    : 1960     MRN: [id number]     Chief Complaint  Hypertension, Hyperlipidemia, and Coronary Artery Disease    Subjective     History of Present Illness:  Nik Jones is a 62 y.o. male who presents today for a follow-up visit.  Patient was seen in the office last in 2022 following a hospitalization for unstable angina, hypertension, severe multivessel CAD with stent placement x2.  He was also found to have left ventricular hypertrophy and grade 1 dysfunction.  He has been following with Dr. Mccall and JUSTYN Palacio with cardiology.  Patient had an x-ray to evaluate chronic left hip pain.  X-ray showed severe osteoarthritic changes of the left hip.  Patient had an evaluation with orthopedics and was told that he had bone-on-bone degeneration of the left hip.  He plans to have a left hip replacement in the near future.  He reports he needs to have some dental work prior to having his surgery done.  He had been taking meloxicam which was prescribed per the orthopedic provider but did not feel that this was helpful in controlling his pain.  He denies further complaints or concerns at this time.  He reports overall feeling well.  Had a pleasant visit with the patient today.      Past Medical History:   Diagnosis Date   • Heart failure (HCC)    • HTN (hypertension)    • Hyperlipemia        Past Surgical History:   Procedure Laterality Date   • CARDIAC CATHETERIZATION N/A 2022    Procedure: Left Heart Cath;  Surgeon: Quentin Mccall MD;  Location:  [facility];  Service: Cardiology;  Laterality: N/A;   • CARDIAC CATHETERIZATION N/A 11/3/2022    Procedure: Percutaneous Coronary Intervention - PCI to Cx;  Surgeon: Quentin Mccall MD;  Location:  [facility];  Service: Cardiology;  Laterality: N/A;       Social History     Socioeconomic History   • Marital status:    Tobacco Use   • Smoking status: Never   •  "Smokeless tobacco: Never   Vaping Use   • Vaping Use: Never used   Substance and Sexual Activity   • Alcohol use: Defer   • Drug use: Never   • Sexual activity: Yes     Partners: Female         Current Outpatient Medications:   •  amLODIPine (NORVASC) 5 MG tablet, Take 1 tablet by mouth Daily., Disp: 90 tablet, Rfl: 3  •  aspirin 81 MG EC tablet, Take 1 tablet by mouth Daily., Disp: 90 tablet, Rfl: 3  •  clopidogrel (PLAVIX) 75 MG tablet, Take 1 tablet by mouth Daily., Disp: 90 tablet, Rfl: 3  •  nebivolol (BYSTOLIC) 20 MG tablet, Take 1 tablet by mouth Daily., Disp: 90 tablet, Rfl: 3  •  nitroglycerin (NITROSTAT) 0.4 MG SL tablet, 1 under the tongue as needed for angina, may repeat q5mins for up three doses, Disp: 25 tablet, Rfl: 0  •  rosuvastatin (CRESTOR) 40 MG tablet, Take 1 tablet by mouth Every Night., Disp: 90 tablet, Rfl: 3    Objective     Vital Signs  /80   Pulse 60   Temp 96.3 °F (35.7 °C)   Ht 167.6 cm (65.98\")   Wt 71.2 kg (157 lb)   SpO2 97%   BMI 25.35 kg/m²   Estimated body mass index is 25.35 kg/m² as calculated from the following:    Height as of this encounter: 167.6 cm (65.98\").    Weight as of this encounter: 71.2 kg (157 lb).    BMI is >= 25 and <30. (Overweight) The following options were offered after discussion;: Not addressed at this visit      Physical Exam  Constitutional:       Appearance: Normal appearance.   HENT:      Head: Normocephalic and atraumatic.      Nose: Nose normal.   Eyes:      Extraocular Movements: Extraocular movements intact.      Conjunctiva/sclera: Conjunctivae normal.      Pupils: Pupils are equal, round, and reactive to light.   Cardiovascular:      Rate and Rhythm: Normal rate and regular rhythm.      Heart sounds: Normal heart sounds.   Pulmonary:      Effort: Pulmonary effort is normal.      Breath sounds: Normal breath sounds.   Musculoskeletal:         General: Normal range of motion.      Cervical back: Normal range of motion and neck supple. "   Skin:     General: Skin is warm and dry.   Neurological:      General: No focal deficit present.      Mental Status: He is alert and oriented to person, place, and time. Mental status is at baseline.   Psychiatric:         Mood and Affect: Mood normal.         Behavior: Behavior normal.         Thought Content: Thought content normal.         Judgment: Judgment normal.          Assessment and Plan     Diagnoses and all orders for this visit:    1. Follow-up exam (Primary)    2. Hyperlipidemia, unspecified hyperlipidemia type  -     Comprehensive Metabolic Panel; Future  -     Lipid Panel; Future  -     Hemoglobin A1c; Future  -     CBC (No Diff); Future    3. Primary hypertension  -     Comprehensive Metabolic Panel; Future  -     Lipid Panel; Future  -     Hemoglobin A1c; Future  -     CBC (No Diff); Future    4. Coronary artery disease involving coronary bypass graft of native heart with unstable angina pectoris (HCC)  -     Comprehensive Metabolic Panel; Future  -     Lipid Panel; Future  -     Hemoglobin A1c; Future  -     CBC (No Diff); Future    Plan:  Will plan to check CMP, CBC, lipid panel, and A1c prior to next appointment.  Blood pressure well controlled in the office today.  Continue with current blood pressure medications.  Continue with current dose of statin.  Keep follow-up appointment with all specialist.  We will plan on 3-month follow-up for chronic conditions.  Return to clinic sooner if needed.    Follow Up  Return in about 3 months (around 5/18/2023) for Recheck.    JUSTYN Pardo    Part of this note may be an electronic transcription/translation of spoken language to printed text using the Dragon Dictation System.

## 2023-05-08 ENCOUNTER — OFFICE VISIT (OUTPATIENT)
Dept: INTERNAL MEDICINE | Facility: CLINIC | Age: 63
End: 2023-05-08
Payer: MEDICAID

## 2023-05-08 VITALS
OXYGEN SATURATION: 97 % | HEIGHT: 66 IN | DIASTOLIC BLOOD PRESSURE: 84 MMHG | WEIGHT: 159 LBS | SYSTOLIC BLOOD PRESSURE: 132 MMHG | TEMPERATURE: 97.4 F | HEART RATE: 60 BPM | BODY MASS INDEX: 25.55 KG/M2

## 2023-05-08 DIAGNOSIS — M16.12 PRIMARY OSTEOARTHRITIS OF LEFT HIP: ICD-10-CM

## 2023-05-08 DIAGNOSIS — E78.5 HYPERLIPIDEMIA, UNSPECIFIED HYPERLIPIDEMIA TYPE: ICD-10-CM

## 2023-05-08 DIAGNOSIS — Z12.11 SCREENING FOR COLON CANCER: ICD-10-CM

## 2023-05-08 DIAGNOSIS — I25.700 CORONARY ARTERY DISEASE INVOLVING CORONARY BYPASS GRAFT OF NATIVE HEART WITH UNSTABLE ANGINA PECTORIS: ICD-10-CM

## 2023-05-08 DIAGNOSIS — I10 PRIMARY HYPERTENSION: ICD-10-CM

## 2023-05-08 DIAGNOSIS — Z00.00 ANNUAL PHYSICAL EXAM: Primary | ICD-10-CM

## 2023-05-08 NOTE — PROGRESS NOTES
Office Note     Name: Nik Jones    : 1960     MRN: [id number]     Chief Complaint  Annual Exam    Subjective     History of Present Illness:  Nik Jones is a 62 y.o. male who presents today for annual physical exam.  Overall, patient reports feeling well.  Patient declines a COVID booster today.  He is agreeable to a Cologuard test.  He completed his cardiac rehab.  He does work at [employer] and felt like he was getting enough exercise with his job.  He has seen Ortho for possible left hip replacement.  He plans to have this done in the summer.  He denies current complaints or concerns.    The patient is being seen for a health maintenance evaluation.    Hypertension: Blood pressure well controlled.  Patient currently on amlodipine 5 mg daily and Bystolic 20 mg daily.  Followed by cardiology.    Coronary artery disease: Status post VANGIE x2 to the mid distal circumflex.  Followed by cardiology.  Currently on aspirin 81 mg daily, Plavix 75 mg daily, and rosuvastatin 40 mg nightly.    Hyperlipidemia: Currently on rosuvastatin 40 mg nightly.    Osteoarthritis of left hip: Plans for total left hip replacement this summer.  Followed by orthopedics.    Past Medical History:   Diagnosis Date   • Heart failure    • HTN (hypertension)    • Hyperlipemia        Past Surgical History:   Procedure Laterality Date   • CARDIAC CATHETERIZATION N/A 2022    Procedure: Left Heart Cath;  Surgeon: Quentin Mccall MD;  Location:  [facility];  Service: Cardiology;  Laterality: N/A;   • CARDIAC CATHETERIZATION N/A 11/3/2022    Procedure: Percutaneous Coronary Intervention - PCI to Cx;  Surgeon: Quentin Mccall MD;  Location:  [facility];  Service: Cardiology;  Laterality: N/A;       Social History     Socioeconomic History   • Marital status:    Tobacco Use   • Smoking status: Never   • Smokeless tobacco: Never   Vaping Use   • Vaping Use: Never used  "  Substance and Sexual Activity   • Alcohol use: Defer   • Drug use: Never   • Sexual activity: Yes     Partners: Female         Current Outpatient Medications:   •  amLODIPine (NORVASC) 5 MG tablet, Take 1 tablet by mouth Daily., Disp: 90 tablet, Rfl: 3  •  aspirin 81 MG EC tablet, Take 1 tablet by mouth Daily., Disp: 90 tablet, Rfl: 3  •  clopidogrel (PLAVIX) 75 MG tablet, Take 1 tablet by mouth Daily., Disp: 90 tablet, Rfl: 3  •  nebivolol (BYSTOLIC) 20 MG tablet, Take 1 tablet by mouth Daily., Disp: 90 tablet, Rfl: 3  •  nitroglycerin (NITROSTAT) 0.4 MG SL tablet, 1 under the tongue as needed for angina, may repeat q5mins for up three doses, Disp: 25 tablet, Rfl: 0  •  rosuvastatin (CRESTOR) 40 MG tablet, Take 1 tablet by mouth Every Night., Disp: 90 tablet, Rfl: 3    General History  Nik  does not have regular dental visits.  He does not complain of vision problems. Last eye exam was unknown. Wears reading glasses.  Immunizations are not up to date. The patient needs the following immunizations: Patient declines immunizations today.    Lifestyle  Nik  consumes a in general, a \"healthy\" diet  .  He exercises intermittently.    Reproductive Health  Nik  is sexually active. His contraceptive plan is no method.   He does not have erectile dysfunction.     Screening  Last PSA was never.  Last prostate exam was never. Family history of prostate cancer: Unknown.  Last testicular exam was never.   Last colonoscopy was never.  Last Completed Colonoscopy     This patient has no relevant Health Maintenance data.      . Family history of colon cancer: None.  Other pertinent family history and/or screenings: None at this time.    Review of Systems   Constitutional: Negative.    HENT: Negative.    Respiratory: Negative.    Cardiovascular: Negative.    Gastrointestinal: Negative.    Genitourinary: Negative.    Musculoskeletal: Positive for arthralgias.   Skin: Negative.    Neurological: Negative.  " "  Psychiatric/Behavioral: Negative.        Objective     Vital Signs  /84   Pulse 60   Temp 97.4 °F (36.3 °C)   Ht 167.6 cm (65.98\")   Wt 72.1 kg (159 lb)   SpO2 97%   BMI 25.68 kg/m²   Estimated body mass index is 25.68 kg/m² as calculated from the following:    Height as of this encounter: 167.6 cm (65.98\").    Weight as of this encounter: 72.1 kg (159 lb).    BMI is >= 25 and <30. (Overweight) The following options were offered after discussion;: exercise counseling/recommendations and nutrition counseling/recommendations      Physical Exam  Constitutional:       General: He is awake. He is not in acute distress.     Appearance: Normal appearance. He is well-developed, well-groomed and normal weight. He is not ill-appearing.   HENT:      Head: Normocephalic and atraumatic.      Right Ear: Tympanic membrane, ear canal and external ear normal.      Left Ear: Tympanic membrane, ear canal and external ear normal.      Nose: Nose normal.      Mouth/Throat:      Mouth: Mucous membranes are moist.      Pharynx: Oropharynx is clear.   Eyes:      General: No scleral icterus.     Extraocular Movements: Extraocular movements intact.      Conjunctiva/sclera: Conjunctivae normal.      Pupils: Pupils are equal, round, and reactive to light.   Neck:      Trachea: Trachea normal.   Cardiovascular:      Rate and Rhythm: Normal rate and regular rhythm.      Pulses: Normal pulses.      Heart sounds: Normal heart sounds. No murmur heard.  Pulmonary:      Effort: Pulmonary effort is normal. No tachypnea, accessory muscle usage or respiratory distress.      Breath sounds: Normal breath sounds. No wheezing, rhonchi or rales.   Abdominal:      General: Abdomen is flat. Bowel sounds are normal. There is no distension.      Palpations: Abdomen is soft.      Tenderness: There is no abdominal tenderness.   Musculoskeletal:         General: No swelling. Normal range of motion.      Cervical back: Normal range of motion and neck " supple. No tenderness.      Right lower leg: No edema.      Left lower leg: No edema.      Comments: Antalgic gait noted   Skin:     General: Skin is warm and dry.      Capillary Refill: Capillary refill takes less than 2 seconds.      Coloration: Skin is not jaundiced or pale.      Findings: No lesion or rash.   Neurological:      General: No focal deficit present.      Mental Status: He is alert and oriented to person, place, and time. Mental status is at baseline.      Motor: No weakness.      Gait: Gait is intact.   Psychiatric:         Attention and Perception: Attention normal.         Mood and Affect: Mood normal.         Speech: Speech normal.         Behavior: Behavior normal. Behavior is cooperative.         Thought Content: Thought content normal.         Judgment: Judgment normal.          Assessment and Plan     Diagnoses and all orders for this visit:    1. Annual physical exam (Primary)    2. Coronary artery disease involving coronary bypass graft of native heart with unstable angina pectoris    3. Hyperlipidemia, unspecified hyperlipidemia type    4. Primary hypertension    5. Primary osteoarthritis of left hip    6. Screening for colon cancer  -     Cologuard - Stool, Per Rectum; Future    Plan:  Patient presents today for annual physical exam.  Will order Cologuard for colon cancer screening.  Patient declines COVID booster and other vaccines today.  Lab orders previously placed and not completed.  Patient agreeable to have fasting labs completed within the next week.  Further plan of care based on lab findings.  Patient does not need any medication refills today.  Continue to maintain up-to-date immunizations and health maintenance screenings.  Continue with healthy lifestyle choices such as healthy diet and eating habits and regular exercise routine.  Plan for total left hip replacement this summer.  Continue to maintain adequate oral hydration.  Return to clinic in 1 year for annual physical  exam with fasting labs.  Return to clinic sooner if needed.  Plan for 6-month follow-up on chronic conditions.    Follow Up  Return in about 1 year (around 5/8/2024) for Annual.    JUSTYN Pardo    Part of this note may be an electronic transcription/translation of spoken language to printed text using the Dragon Dictation System.

## 2023-05-26 ENCOUNTER — LAB (OUTPATIENT)
Dept: LAB | Facility: HOSPITAL | Age: 63
End: 2023-05-26
Payer: MEDICAID

## 2023-05-26 DIAGNOSIS — I10 PRIMARY HYPERTENSION: ICD-10-CM

## 2023-05-26 DIAGNOSIS — E78.5 HYPERLIPIDEMIA, UNSPECIFIED HYPERLIPIDEMIA TYPE: ICD-10-CM

## 2023-05-26 DIAGNOSIS — I25.700 CORONARY ARTERY DISEASE INVOLVING CORONARY BYPASS GRAFT OF NATIVE HEART WITH UNSTABLE ANGINA PECTORIS: ICD-10-CM

## 2023-05-26 PROCEDURE — 83036 HEMOGLOBIN GLYCOSYLATED A1C: CPT | Performed by: NURSE PRACTITIONER

## 2023-05-27 LAB
ALBUMIN SERPL-MCNC: 4.1 G/DL (ref 3.5–5.2)
ALBUMIN/GLOB SERPL: 1.4 G/DL
ALP SERPL-CCNC: 70 U/L (ref 39–117)
ALT SERPL-CCNC: 27 U/L (ref 1–41)
AST SERPL-CCNC: 19 U/L (ref 1–40)
BILIRUB SERPL-MCNC: 0.3 MG/DL (ref 0–1.2)
BUN SERPL-MCNC: 17 MG/DL (ref 8–23)
BUN/CREAT SERPL: 16.3 (ref 7–25)
CALCIUM SERPL-MCNC: 9.5 MG/DL (ref 8.6–10.5)
CHLORIDE SERPL-SCNC: 105 MMOL/L (ref 98–107)
CHOLEST SERPL-MCNC: 170 MG/DL (ref 0–200)
CO2 SERPL-SCNC: 26.3 MMOL/L (ref 22–29)
CREAT SERPL-MCNC: 1.04 MG/DL (ref 0.76–1.27)
EGFRCR SERPLBLD CKD-EPI 2021: 81.2 ML/MIN/1.73
ERYTHROCYTE [DISTWIDTH] IN BLOOD BY AUTOMATED COUNT: 13.4 % (ref 12.3–15.4)
GLOBULIN SER CALC-MCNC: 2.9 GM/DL
GLUCOSE SERPL-MCNC: 97 MG/DL (ref 65–99)
HCT VFR BLD AUTO: 43.9 % (ref 37.5–51)
HDLC SERPL-MCNC: 48 MG/DL (ref 40–60)
HGB BLD-MCNC: 14.5 G/DL (ref 13–17.7)
LDLC SERPL CALC-MCNC: 105 MG/DL (ref 0–100)
MCH RBC QN AUTO: 26.7 PG (ref 26.6–33)
MCHC RBC AUTO-ENTMCNC: 33 G/DL (ref 31.5–35.7)
MCV RBC AUTO: 80.8 FL (ref 79–97)
PLATELET # BLD AUTO: 270 10*3/MM3 (ref 140–450)
POTASSIUM SERPL-SCNC: 4.8 MMOL/L (ref 3.5–5.2)
PROT SERPL-MCNC: 7 G/DL (ref 6–8.5)
RBC # BLD AUTO: 5.43 10*6/MM3 (ref 4.14–5.8)
SODIUM SERPL-SCNC: 141 MMOL/L (ref 136–145)
TRIGL SERPL-MCNC: 90 MG/DL (ref 0–150)
VLDLC SERPL CALC-MCNC: 17 MG/DL (ref 5–40)
WBC # BLD AUTO: 5.05 10*3/MM3 (ref 3.4–10.8)

## 2023-06-08 ENCOUNTER — TELEPHONE (OUTPATIENT)
Dept: INTERNAL MEDICINE | Facility: CLINIC | Age: 63
End: 2023-06-08
Payer: MEDICAID

## 2023-06-08 DIAGNOSIS — R19.5 POSITIVE COLORECTAL CANCER SCREENING USING COLOGUARD TEST: Primary | ICD-10-CM

## 2023-06-08 NOTE — TELEPHONE ENCOUNTER
----- Message from JUSTYN Pardo sent at 6/8/2023  2:54 PM EDT -----  Please call and let the patient know that his Cologuard test came back positive.  Whenever there is a positive test it is recommended to follow with a colonoscopy.  I would like to go ahead and order this for him if he is agreeable?  Please let me know.

## 2023-06-12 ENCOUNTER — TELEPHONE (OUTPATIENT)
Dept: INTERNAL MEDICINE | Facility: CLINIC | Age: 63
End: 2023-06-12
Payer: MEDICAID

## 2023-06-12 NOTE — TELEPHONE ENCOUNTER
Caller: Nik Jones Marcelo    Relationship: Self    Best call back number: [phone]    What is the best time to reach you: ANY TIME    Who are you requesting to speak with (clinical staff, provider,  specific staff member): NURSE    Do you know the name of the person who called: SELF    What was the call regarding: PATIENT RECEIVED CALL FROM REFERRAL FOR COLONOSCOPY HOWEVER IT IS IN [place]. CAN PATIENT BE REFERRED TO SOMEONE IN [place]? PLEASE ADVISE    Is it okay if the provider responds through MyChart: YES

## 2023-07-25 ENCOUNTER — OFFICE VISIT (OUTPATIENT)
Dept: CARDIOLOGY | Facility: CLINIC | Age: 63
End: 2023-07-25
Payer: MEDICAID

## 2023-07-25 VITALS
OXYGEN SATURATION: 99 % | BODY MASS INDEX: 25.71 KG/M2 | DIASTOLIC BLOOD PRESSURE: 62 MMHG | HEIGHT: 66 IN | RESPIRATION RATE: 18 BRPM | SYSTOLIC BLOOD PRESSURE: 116 MMHG | WEIGHT: 160 LBS | HEART RATE: 65 BPM

## 2023-07-25 DIAGNOSIS — I25.10 CORONARY ARTERY DISEASE INVOLVING NATIVE CORONARY ARTERY OF NATIVE HEART WITHOUT ANGINA PECTORIS: Primary | ICD-10-CM

## 2023-07-25 DIAGNOSIS — E78.5 HYPERLIPIDEMIA, UNSPECIFIED HYPERLIPIDEMIA TYPE: ICD-10-CM

## 2023-07-25 DIAGNOSIS — I10 PRIMARY HYPERTENSION: ICD-10-CM

## 2023-07-25 PROCEDURE — 3074F SYST BP LT 130 MM HG: CPT | Performed by: INTERNAL MEDICINE

## 2023-07-25 PROCEDURE — 3078F DIAST BP <80 MM HG: CPT | Performed by: INTERNAL MEDICINE

## 2023-07-25 PROCEDURE — 99214 OFFICE O/P EST MOD 30 MIN: CPT | Performed by: INTERNAL MEDICINE

## 2023-07-25 NOTE — PROGRESS NOTES
[facility] Cardiology  [address], [address]  [place], [place], [address] [phone]  [email or web address].[email or web address] HospitalUnited MapsM[facility]           OUTPATIENT CLINIC FOLLOW UP NOTE    Patient care team:  Patient Care Team:  Brielle Tinoco APRN as PCP - General (Internal Medicine)  Quentin Mccall MD as Consulting Physician (Cardiology)      Subjective:   Chief complaint:   Chief Complaint   Patient presents with    Follow-up     Coronary artery disease involving native coronary artery of native heart without angina pectoris         HPI:    Nik Jones is a 62 y.o. male.  Cardiac focused problem list:  CAD   [facility] 11/01/2022:  Severe multivessel CAD involving a large dominant circumflex and a small nondominant RCA.  Echocardiogram 11/01/2022:  LVEF 56-60%. Mild LVH. Hypokinetic wall segments including basal infereolateral and basal inferior. Grade I impaired relaxation.  [facility] 11/03/2022: Tandem 70 and 90% mid to distal circumflex lesions status post VANGIE x 2 distally.  Second OM branch was also treated with kissing balloon inflation.   Hypertension   Mixed hyperlipidemia    Patient presents today for follow up.     Doing well.  Denies chest pain, dyspnea, palpitations    Review of Systems:  As noted above in the HPI    PFSH:  Patient Active Problem List   Diagnosis    Precordial pain    Unstable angina    Coronary artery disease involving coronary bypass graft of native heart with unstable angina pectoris    Hypertension    Hyperlipidemia         Current Outpatient Medications:     amLODIPine (NORVASC) 5 MG tablet, Take 1 tablet by mouth Daily., Disp: 90 tablet, Rfl: 3    aspirin 81 MG EC tablet, Take 1 tablet by mouth Daily., Disp: 90 tablet, Rfl: 3    clopidogrel (PLAVIX) 75 MG tablet, Take 1 tablet by mouth Daily., Disp: 90 tablet, Rfl: 3    nebivolol (BYSTOLIC) 20 MG tablet, Take 1 tablet by mouth Daily., Disp: 90 tablet, Rfl: 3    nitroglycerin (NITROSTAT) 0.4 MG SL tablet, 1 under the tongue as needed for  "angina, may repeat q5mins for up three doses, Disp: 25 tablet, Rfl: 0    rosuvastatin (CRESTOR) 40 MG tablet, Take 1 tablet by mouth Every Night., Disp: 90 tablet, Rfl: 3    No Known Allergies    Social History     Socioeconomic History    Marital status:    Tobacco Use    Smoking status: Never     Passive exposure: Past    Smokeless tobacco: Never   Vaping Use    Vaping Use: Never used   Substance and Sexual Activity    Alcohol use: Not Currently    Drug use: Never    Sexual activity: Yes     Partners: Female         Objective:   Physical Exam:  /62 (BP Location: Right arm, Patient Position: Sitting, Cuff Size: Adult)   Pulse 65   Resp 18   Ht 167.6 cm (65.98\")   Wt 72.6 kg (160 lb)   SpO2 99%   BMI 25.84 kg/m²   CONSTITUTIONAL: No acute distress  RESPIRATORY: Normal effort. Clear to auscultation bilaterally without wheezing or rales  CARDIOVASCULAR: Regular rate and rhythm with normal S1 and S2. Without murmur.  PERIPHERAL VASCULAR: Normal radial pulse.       Labs:  Labs reviewed by myself      Lab Results   Component Value Date    CHOL 232 (H) 11/02/2022     Lab Results   Component Value Date    TRIG 90 05/26/2023     Lab Results   Component Value Date    HDL 48 05/26/2023     Lab Results   Component Value Date     (H) 05/26/2023     No components found for: LDLDIRECTC    Diagnostic Data:    Procedures    TTE 11/01/2022    Left ventricular systolic function is normal. Left ventricular ejection fraction appears to be 56 - 60%.    Left ventricular wall thickness is consistent with mild concentric hypertrophy.    The following left ventricular wall segments are hypokinetic: basal inferolateral and basal inferior.    Left ventricular diastolic function is consistent with (grade I) impaired relaxation.    [facility] 11/01/2022    There is severe multivessel CAD involving a large dominant circumflex and a small nondominant RCA.    [facility] 11/03/2022    The tandem 70% and 90% mid to distal circumflex " lesions are status post intervention with a Xience Skypoint 4.0 x 18 mm drug-eluting stent proximally and a Xience Skypoint 3.5 x 38 mm drug-eluting stent distally.  The second OM branch was also treated with a kissing balloon inflation.      Assessment and Plan:     Coronary artery disease   Hyperlipidemia  -Continue other current medications  -Again discussed the importance of lifestyle modifications including a heart healthy diet and regular exercise  -If next LDL is still not at goal, recommend adding Repatha  -We will consider discontinuing clopidogrel formally 2 years after stenting (11/2024)    Preoperative cardiac risk assessment  -In the interim, okay to hold clopidogrel for procedures including upcoming colonoscopy, possible dental implants, and hip surgery.  Okay to hold clopidogrel for 5 days prior to these procedures.  Continue aspirin perioperatively.        - No follow-ups on file.    Quentin Mccall MD, MSc, FACC, [place]  Interventional Cardiology  [facility]

## 2023-09-07 RX ORDER — SODIUM PICOSULFATE, MAGNESIUM OXIDE, AND ANHYDROUS CITRIC ACID 10; 3.5; 12 MG/160ML; G/160ML; G/160ML
350 LIQUID ORAL TAKE AS DIRECTED
Qty: 350 ML | Refills: 0 | Status: SHIPPED | OUTPATIENT
Start: 2023-09-07

## 2023-10-31 RX ORDER — CLOPIDOGREL BISULFATE 75 MG/1
75 TABLET ORAL DAILY
Qty: 90 TABLET | Refills: 3 | Status: SHIPPED | OUTPATIENT
Start: 2023-10-31

## 2023-11-10 RX ORDER — ASPIRIN 81 MG/1
81 TABLET, COATED ORAL DAILY
Qty: 90 TABLET | Refills: 3 | Status: SHIPPED | OUTPATIENT
Start: 2023-11-10

## 2023-11-10 RX ORDER — AMLODIPINE BESYLATE 5 MG/1
5 TABLET ORAL DAILY
Qty: 90 TABLET | Refills: 3 | Status: SHIPPED | OUTPATIENT
Start: 2023-11-10

## 2023-11-11 ENCOUNTER — OFFICE VISIT (OUTPATIENT)
Dept: INTERNAL MEDICINE | Facility: CLINIC | Age: 63
End: 2023-11-11
Payer: MEDICAID

## 2023-11-11 VITALS
SYSTOLIC BLOOD PRESSURE: 112 MMHG | HEIGHT: 66 IN | HEART RATE: 54 BPM | DIASTOLIC BLOOD PRESSURE: 62 MMHG | BODY MASS INDEX: 25.88 KG/M2 | WEIGHT: 161 LBS | OXYGEN SATURATION: 98 % | TEMPERATURE: 95.8 F

## 2023-11-11 DIAGNOSIS — R73.09 ELEVATED HEMOGLOBIN A1C: ICD-10-CM

## 2023-11-11 DIAGNOSIS — Z09 FOLLOW-UP EXAMINATION: Primary | ICD-10-CM

## 2023-11-11 DIAGNOSIS — E78.5 HYPERLIPIDEMIA, UNSPECIFIED HYPERLIPIDEMIA TYPE: ICD-10-CM

## 2023-11-11 DIAGNOSIS — I25.700 CORONARY ARTERY DISEASE INVOLVING CORONARY BYPASS GRAFT OF NATIVE HEART WITH UNSTABLE ANGINA PECTORIS: ICD-10-CM

## 2023-11-11 DIAGNOSIS — I10 PRIMARY HYPERTENSION: ICD-10-CM

## 2023-11-11 DIAGNOSIS — Z13.29 SCREENING FOR HYPOTHYROIDISM: ICD-10-CM

## 2023-11-11 NOTE — PROGRESS NOTES
Office Note     Name: Nik Jones    : 1960     MRN: [id number]     Chief Complaint  Hyperlipidemia, Hypertension, and Follow-up    Subjective     History of Present Illness:  Nik Jones is a 63 y.o. male who presents today for a 6-month follow-up on chronic conditions.  Patient denies any changes to medical history or medications since the previous visit.  Blood pressures well controlled in the office today.  Patient is currently taking amlodipine 5 mg daily along with nebivolol 20 mg daily.  Patient does follow with Dr. Mccall with cardiology.  Patient has not seen Ortho for evaluation of his left hip complaints.  He reports he needs dental work done before he can address his hip concerns.  He was looking into getting implants but felt this option was not feasible.  He is now looking into getting dentures and hopes to have this addressed by the first of the year.  He reports his hip pain has been well controlled recently.  He has been playing pickle ball recently.  He does note there are certain position changes that do cause an increase in discomfort.  He is also in the process of getting scheduled for colonoscopy.  He had this previously scheduled and forgot to hold his blood thinner.  He plans to get this rescheduled at his convenience.  He did do the Cologuard testing which came back positive.  He denies any current blood in the stool or concerns.  Overall, he is doing well today without current complaints or concerns.  Pleasant visit with the patient today.      Past Medical History:   Diagnosis Date    Heart failure     HTN (hypertension)     Hyperlipemia        Past Surgical History:   Procedure Laterality Date    CARDIAC CATHETERIZATION N/A 2022    Procedure: Left Heart Cath;  Surgeon: Quentin Mccall MD;  Location: [facility] CATH INVASIVE LOCATION;  Service: Cardiology;  Laterality: N/A;    CARDIAC CATHETERIZATION N/A 11/3/2022    Procedure: Percutaneous Coronary Intervention -  "PCI to Cx;  Surgeon: Quentin Mccall MD;  Location: [facility] CATH INVASIVE LOCATION;  Service: Cardiology;  Laterality: N/A;       Social History     Socioeconomic History    Marital status:    Tobacco Use    Smoking status: Never     Passive exposure: Past    Smokeless tobacco: Never   Vaping Use    Vaping Use: Never used   Substance and Sexual Activity    Alcohol use: Not Currently    Drug use: Never    Sexual activity: Yes     Partners: Female         Current Outpatient Medications:     amLODIPine (NORVASC) 5 MG tablet, TAKE ONE TABLET BY MOUTH DAILY, Disp: 90 tablet, Rfl: 3    Aspirin Low Dose 81 MG EC tablet, TAKE ONE TABLET BY MOUTH DAILY, Disp: 90 tablet, Rfl: 3    clopidogrel (PLAVIX) 75 MG tablet, TAKE ONE TABLET BY MOUTH DAILY, Disp: 90 tablet, Rfl: 3    nebivolol (BYSTOLIC) 20 MG tablet, Take 1 tablet by mouth Daily., Disp: 90 tablet, Rfl: 3    nitroglycerin (NITROSTAT) 0.4 MG SL tablet, 1 under the tongue as needed for angina, may repeat q5mins for up three doses, Disp: 25 tablet, Rfl: 0    rosuvastatin (CRESTOR) 40 MG tablet, Take 1 tablet by mouth Every Night., Disp: 90 tablet, Rfl: 3    Objective     Vital Signs  /62   Pulse 54   Temp 95.8 °F (35.4 °C)   Ht 167.6 cm (65.98\")   Wt 73 kg (161 lb)   SpO2 98%   BMI 26.00 kg/m²   Estimated body mass index is 26 kg/m² as calculated from the following:    Height as of this encounter: 167.6 cm (65.98\").    Weight as of this encounter: 73 kg (161 lb).           Physical Exam  Constitutional:       Appearance: Normal appearance. He is normal weight.   HENT:      Head: Normocephalic and atraumatic.      Nose: Nose normal.   Eyes:      Extraocular Movements: Extraocular movements intact.      Conjunctiva/sclera: Conjunctivae normal.      Pupils: Pupils are equal, round, and reactive to light.   Cardiovascular:      Rate and Rhythm: Regular rhythm. Bradycardia present.   Pulmonary:      Effort: Pulmonary effort is normal. No respiratory distress. "      Breath sounds: Normal breath sounds.   Musculoskeletal:         General: Normal range of motion.      Cervical back: Normal range of motion and neck supple.   Skin:     General: Skin is warm and dry.   Neurological:      General: No focal deficit present.      Mental Status: He is alert and oriented to person, place, and time. Mental status is at baseline.   Psychiatric:         Mood and Affect: Mood normal.         Behavior: Behavior normal.         Thought Content: Thought content normal.         Judgment: Judgment normal.          Assessment and Plan     Diagnoses and all orders for this visit:    1. Follow-up examination (Primary)    2. Coronary artery disease involving coronary bypass graft of native heart with unstable angina pectoris    3. Hyperlipidemia, unspecified hyperlipidemia type  -     Lipid Panel; Future    4. Primary hypertension  -     CBC (No Diff); Future  -     Comprehensive metabolic panel; Future    5. Elevated hemoglobin A1c  -     Hemoglobin A1c; Future    6. Screening for hypothyroidism  -     TSH Rfx On Abnormal To Free T4; Future        Follow Up  Return in about 6 months (around 5/11/2024) for Annual.    JUSTYN Pardo    Part of this note may be an electronic transcription/translation of spoken language to printed text using the Dragon Dictation System.

## 2023-12-05 RX ORDER — ROSUVASTATIN CALCIUM 40 MG/1
40 TABLET, COATED ORAL NIGHTLY
Qty: 90 TABLET | Refills: 3 | Status: SHIPPED | OUTPATIENT
Start: 2023-12-05

## 2024-01-23 RX ORDER — NEBIVOLOL 20 MG/1
20 TABLET ORAL DAILY
Qty: 90 TABLET | Refills: 3 | Status: SHIPPED | OUTPATIENT
Start: 2024-01-23

## 2024-02-12 ENCOUNTER — OFFICE VISIT (OUTPATIENT)
Dept: CARDIOLOGY | Facility: CLINIC | Age: 64
End: 2024-02-12
Payer: MEDICAID

## 2024-02-12 VITALS
BODY MASS INDEX: 26.68 KG/M2 | OXYGEN SATURATION: 96 % | HEART RATE: 49 BPM | DIASTOLIC BLOOD PRESSURE: 68 MMHG | SYSTOLIC BLOOD PRESSURE: 132 MMHG | WEIGHT: 166 LBS | HEIGHT: 66 IN

## 2024-02-12 DIAGNOSIS — I10 PRIMARY HYPERTENSION: ICD-10-CM

## 2024-02-12 DIAGNOSIS — E78.5 HYPERLIPIDEMIA, UNSPECIFIED HYPERLIPIDEMIA TYPE: ICD-10-CM

## 2024-02-12 DIAGNOSIS — I25.10 CORONARY ARTERY DISEASE INVOLVING NATIVE CORONARY ARTERY OF NATIVE HEART WITHOUT ANGINA PECTORIS: Primary | ICD-10-CM

## 2024-02-12 PROCEDURE — 3075F SYST BP GE 130 - 139MM HG: CPT | Performed by: INTERNAL MEDICINE

## 2024-02-12 PROCEDURE — 99214 OFFICE O/P EST MOD 30 MIN: CPT | Performed by: INTERNAL MEDICINE

## 2024-02-12 PROCEDURE — 93000 ELECTROCARDIOGRAM COMPLETE: CPT | Performed by: INTERNAL MEDICINE

## 2024-02-12 PROCEDURE — 3078F DIAST BP <80 MM HG: CPT | Performed by: INTERNAL MEDICINE

## 2024-02-12 RX ORDER — NEBIVOLOL 10 MG/1
10 TABLET ORAL DAILY
Qty: 90 TABLET | Refills: 3 | Status: SHIPPED | OUTPATIENT
Start: 2024-02-12

## 2024-02-12 RX ORDER — AMLODIPINE BESYLATE 10 MG/1
10 TABLET ORAL DAILY
Qty: 90 TABLET | Refills: 3 | Status: SHIPPED | OUTPATIENT
Start: 2024-02-12

## 2024-06-04 ENCOUNTER — LAB (OUTPATIENT)
Dept: LAB | Facility: HOSPITAL | Age: 64
End: 2024-06-04
Payer: MEDICAID

## 2024-06-04 PROCEDURE — 80061 LIPID PANEL: CPT | Performed by: INTERNAL MEDICINE

## 2024-06-04 PROCEDURE — 84443 ASSAY THYROID STIM HORMONE: CPT | Performed by: INTERNAL MEDICINE

## 2024-06-04 PROCEDURE — 83036 HEMOGLOBIN GLYCOSYLATED A1C: CPT | Performed by: NURSE PRACTITIONER

## 2024-06-04 PROCEDURE — 80053 COMPREHEN METABOLIC PANEL: CPT | Performed by: INTERNAL MEDICINE

## 2024-06-04 PROCEDURE — 85027 COMPLETE CBC AUTOMATED: CPT | Performed by: NURSE PRACTITIONER

## 2024-06-05 ENCOUNTER — OFFICE VISIT (OUTPATIENT)
Dept: INTERNAL MEDICINE | Facility: CLINIC | Age: 64
End: 2024-06-05
Payer: MEDICAID

## 2024-06-05 VITALS
TEMPERATURE: 97.3 F | OXYGEN SATURATION: 96 % | SYSTOLIC BLOOD PRESSURE: 128 MMHG | HEART RATE: 69 BPM | WEIGHT: 165.4 LBS | DIASTOLIC BLOOD PRESSURE: 83 MMHG | HEIGHT: 66 IN | BODY MASS INDEX: 26.58 KG/M2

## 2024-06-05 DIAGNOSIS — M16.12 OSTEOARTHRITIS OF LEFT HIP, UNSPECIFIED OSTEOARTHRITIS TYPE: ICD-10-CM

## 2024-06-05 DIAGNOSIS — R73.09 ELEVATED HEMOGLOBIN A1C: ICD-10-CM

## 2024-06-05 DIAGNOSIS — Z00.00 ANNUAL PHYSICAL EXAM: Primary | ICD-10-CM

## 2024-06-05 DIAGNOSIS — R19.5 POSITIVE COLORECTAL CANCER SCREENING USING COLOGUARD TEST: ICD-10-CM

## 2024-06-05 DIAGNOSIS — I25.700 CORONARY ARTERY DISEASE INVOLVING CORONARY BYPASS GRAFT OF NATIVE HEART WITH UNSTABLE ANGINA PECTORIS: ICD-10-CM

## 2024-06-05 DIAGNOSIS — I10 PRIMARY HYPERTENSION: ICD-10-CM

## 2024-06-05 DIAGNOSIS — E78.5 HYPERLIPIDEMIA, UNSPECIFIED HYPERLIPIDEMIA TYPE: ICD-10-CM

## 2024-06-05 PROCEDURE — 1126F AMNT PAIN NOTED NONE PRSNT: CPT | Performed by: NURSE PRACTITIONER

## 2024-06-05 PROCEDURE — 3079F DIAST BP 80-89 MM HG: CPT | Performed by: NURSE PRACTITIONER

## 2024-06-05 PROCEDURE — 99396 PREV VISIT EST AGE 40-64: CPT | Performed by: NURSE PRACTITIONER

## 2024-06-05 PROCEDURE — 1159F MED LIST DOCD IN RCRD: CPT | Performed by: NURSE PRACTITIONER

## 2024-06-05 PROCEDURE — 3074F SYST BP LT 130 MM HG: CPT | Performed by: NURSE PRACTITIONER

## 2024-06-05 PROCEDURE — 1160F RVW MEDS BY RX/DR IN RCRD: CPT | Performed by: NURSE PRACTITIONER

## 2024-06-05 NOTE — PROGRESS NOTES
Office Note     Name: Nik Jones    : 1960     MRN: [id number]     Chief Complaint  Annual Exam    Subjective     History of Present Illness:  Nik Jones is a 63 y.o. male who presents today for annual physical exam.    Hypertension: Followed by Dr. Mccall.most recent visit in 2024.  Patient was bradycardic at that time.  Bystolic was decreased to 10 mg daily and amlodipine was increased to 10 mg daily.  Blood pressure and heart rate well-controlled in the office today.      HLD: Patient currently on Crestor 40mg daily.  Most recent LDL 67.    Left hip osteoarthritis: Patient will consider a left hip replacement in the future.  He is able to perform his ADLs as well as play pickle ball and table tennis without significant pain or discomfort.    Coronary artery disease: Continues on Plavix for now.  Will consider stopping per cardiology at follow-up visit.  History of cardiac stents.    The patient is being seen for a health maintenance evaluation.    Past Medical History:   Diagnosis Date    Heart failure     HTN (hypertension)     Hyperlipemia        Past Surgical History:   Procedure Laterality Date    CARDIAC CATHETERIZATION N/A 2022    Procedure: Left Heart Cath;  Surgeon: Quentin Mccall MD;  Location:  [facility];  Service: Cardiology;  Laterality: N/A;    CARDIAC CATHETERIZATION N/A 11/3/2022    Procedure: Percutaneous Coronary Intervention - PCI to Cx;  Surgeon: Quentin Mccall MD;  Location:  [facility];  Service: Cardiology;  Laterality: N/A;       Social History     Socioeconomic History    Marital status:    Tobacco Use    Smoking status: Never     Passive exposure: Past    Smokeless tobacco: Never   Vaping Use    Vaping status: Never Used   Substance and Sexual Activity    Alcohol use: Not Currently    Drug use: Never    Sexual activity: Yes     Partners: Female         Current Outpatient Medications:     amLODIPine  "(NORVASC) 10 MG tablet, Take 1 tablet by mouth Daily., Disp: 90 tablet, Rfl: 3    Aspirin Low Dose 81 MG EC tablet, TAKE ONE TABLET BY MOUTH DAILY, Disp: 90 tablet, Rfl: 3    clopidogrel (PLAVIX) 75 MG tablet, TAKE ONE TABLET BY MOUTH DAILY, Disp: 90 tablet, Rfl: 3    nebivolol (BYSTOLIC) 10 MG tablet, Take 1 tablet by mouth Daily., Disp: 90 tablet, Rfl: 3    nitroglycerin (NITROSTAT) 0.4 MG SL tablet, 1 under the tongue as needed for angina, may repeat q5mins for up three doses, Disp: 25 tablet, Rfl: 0    rosuvastatin (CRESTOR) 40 MG tablet, TAKE ONE TABLET BY MOUTH ONCE NIGHTLY, Disp: 90 tablet, Rfl: 3    General History  Nik  does not have regular dental visits.  He does complain of vision problems. Last eye exam was unknown. Wears reading glasses.  Immunizations are not up to date. The patient needs the following immunizations: COVID booster, RSV, shingles, and Tdap.    Lifestyle  Nik  consumes a in general, a \"healthy\" diet  , has cut down on fried foods .  He exercises participates in pickle ball, basketball and table tennis . Walks the dog at the park regularly.    Reproductive Health  Nik  is sexually active. His contraceptive plan is no method.   He does not have erectile dysfunction.     Screening  Last PSA was unknown.  Last prostate exam was never. Family history of prostate cancer: Yes, brother with prostate cancer.  Last testicular exam was self.   Last colonoscopy was never. + cologuard. Will r/s colonoscopy.  Last Completed Colonoscopy            Ordered - COLORECTAL CANCER SCREENING (COLOGUARD - Every 3 Years) Ordered on 6/8/2023 05/26/2023  Cologuard component of Cologuard - Stool, Per Rectum                . Family history of colon cancer: Yes, brother with colon cancer.  Other pertinent family history and/or screenings: None at this time.    Review of Systems   Musculoskeletal:  Positive for arthralgias.       Objective     Vital Signs  /83   Pulse 69   Temp 97.3 °F (36.3 " "°C)   Ht 167.6 cm (66\")   Wt 75 kg (165 lb 6.4 oz)   SpO2 96%   BMI 26.70 kg/m²   Estimated body mass index is 26.7 kg/m² as calculated from the following:    Height as of this encounter: 167.6 cm (66\").    Weight as of this encounter: 75 kg (165 lb 6.4 oz).    BMI is >= 25 and <30. (Overweight) The following options were offered after discussion;: exercise counseling/recommendations and nutrition counseling/recommendations       Physical Exam  Constitutional:       General: He is awake. He is not in acute distress.     Appearance: Normal appearance. He is well-developed and well-groomed. He is not ill-appearing.   HENT:      Head: Normocephalic and atraumatic.      Right Ear: Tympanic membrane, ear canal and external ear normal.      Left Ear: Tympanic membrane, ear canal and external ear normal.      Nose: Nose normal.      Mouth/Throat:      Mouth: Mucous membranes are moist.      Dentition: Abnormal dentition.      Pharynx: Oropharynx is clear.   Eyes:      General: No scleral icterus.     Extraocular Movements: Extraocular movements intact.      Conjunctiva/sclera: Conjunctivae normal.      Pupils: Pupils are equal, round, and reactive to light.   Neck:      Trachea: Trachea normal.   Cardiovascular:      Rate and Rhythm: Normal rate and regular rhythm.      Pulses: Normal pulses.      Heart sounds: Normal heart sounds. No murmur heard.  Pulmonary:      Effort: Pulmonary effort is normal. No tachypnea, accessory muscle usage or respiratory distress.      Breath sounds: Normal breath sounds. No wheezing, rhonchi or rales.   Abdominal:      General: Abdomen is flat. Bowel sounds are normal. There is no distension.      Palpations: Abdomen is soft.      Tenderness: There is no abdominal tenderness.   Genitourinary:     Comments: Deferred  Musculoskeletal:         General: No swelling. Normal range of motion.      Cervical back: Normal range of motion and neck supple. No tenderness.      Right lower leg: No " edema.      Left lower leg: No edema.   Skin:     General: Skin is warm and dry.      Capillary Refill: Capillary refill takes less than 2 seconds.      Coloration: Skin is not jaundiced or pale.      Findings: No lesion or rash.   Neurological:      General: No focal deficit present.      Mental Status: He is alert and oriented to person, place, and time. Mental status is at baseline.      Motor: No weakness.      Gait: Gait is intact.   Psychiatric:         Attention and Perception: Attention normal.         Mood and Affect: Mood normal.         Speech: Speech normal.         Behavior: Behavior normal. Behavior is cooperative.         Thought Content: Thought content normal.         Judgment: Judgment normal.           Latest Reference Range & Units 06/04/24 08:53   Sodium 136 - 145 mmol/L 140   Potassium 3.5 - 5.2 mmol/L 4.2   Chloride 98 - 107 mmol/L 107   CO2 22.0 - 29.0 mmol/L 24.5   Anion Gap 5.0 - 15.0 mmol/L 8.5   BUN 8 - 23 mg/dL 16   Creatinine 0.76 - 1.27 mg/dL 1.19   BUN/Creatinine Ratio 7.0 - 25.0  13.4   eGFR >60.0 mL/min/1.73 68.6   Glucose 65 - 99 mg/dL 111 (H)   Calcium 8.6 - 10.5 mg/dL 9.0   Alkaline Phosphatase 39 - 117 U/L 78   Total Protein 6.0 - 8.5 g/dL 7.1   Albumin 3.5 - 5.2 g/dL 4.0   Globulin gm/dL 3.1   A/G Ratio g/dL 1.3   AST (SGOT) 1 - 40 U/L 16   ALT (SGPT) 1 - 41 U/L 23   Total Bilirubin 0.0 - 1.2 mg/dL 0.4   Hemoglobin A1C 4.80 - 5.60 % 6.00 (H)   TSH Baseline 0.270 - 4.200 uIU/mL 1.670   Total Cholesterol 0 - 200 mg/dL 126   HDL Cholesterol 40 - 60 mg/dL 44   LDL Cholesterol  0 - 100 mg/dL 67   VLDL Cholesterol 5 - 40 mg/dL 15   Triglycerides 0 - 150 mg/dL 76   LDL/HDL Ratio  1.52   WBC 3.40 - 10.80 10*3/mm3 5.13   RBC 4.14 - 5.80 10*6/mm3 5.56   Hemoglobin 13.0 - 17.7 g/dL 14.4   Hematocrit 37.5 - 51.0 % 44.5   Platelets 140 - 450 10*3/mm3 256   RDW 12.3 - 15.4 % 13.3   MCV 79.0 - 97.0 fL 80.0   MCH 26.6 - 33.0 pg 25.9 (L)   MCHC 31.5 - 35.7 g/dL 32.4   MPV 6.0 - 12.0 fL 10.7    RDW-SD 37.0 - 54.0 fl 38.5   (H): Data is abnormally high  (L): Data is abnormally low    Assessment and Plan     Diagnoses and all orders for this visit:    1. Annual physical exam (Primary)    2. Coronary artery disease involving coronary bypass graft of native heart with unstable angina pectoris    3. Hyperlipidemia, unspecified hyperlipidemia type    4. Primary hypertension    5. Positive colorectal cancer screening using Cologuard test    6. Osteoarthritis of left hip, unspecified osteoarthritis type    7. Elevated hemoglobin A1c        Plan:  Annual physical exam.  Blood pressure well-controlled.  Continue amlodipine only.  Heart rate well-controlled today.  With Bystolic 10 mg daily.  Cholesterol levels look great.  Continue with rosuvastatin 40 mg nightly.  Discussed with patient the recommendations for COVID booster, RSV, shingles, and Tdap.  Patient declines vaccinations at this time.  Lab results reviewed with the patient today.  Keep all upcoming appointments with specialists.  Encourage patient to reschedule colonoscopy due to positive Cologuard.  Continue with up to date immunizations and health maintenance screenings.  Continue with healthy lifestyle choices such as healthy diet and eating habits and regular exercise routine.  Continue with adequate oral hydration and rest.  Follow-up on chronic conditions in 6 months.  Annual physical exam in 1 year.  Return to clinic sooner if needed.    Follow Up  Return in about 6 months (around 12/5/2024).    JUSTYN Parod    Part of this note may be an electronic transcription/translation of spoken language to printed text using the Dragon Dictation System.

## 2024-10-22 RX ORDER — CLOPIDOGREL BISULFATE 75 MG/1
75 TABLET ORAL DAILY
Qty: 90 TABLET | Refills: 3 | Status: SHIPPED | OUTPATIENT
Start: 2024-10-22

## 2024-11-06 RX ORDER — ASPIRIN 81 MG/1
81 TABLET, COATED ORAL DAILY
Qty: 90 TABLET | Refills: 3 | Status: SHIPPED | OUTPATIENT
Start: 2024-11-06

## 2024-12-06 ENCOUNTER — OFFICE VISIT (OUTPATIENT)
Dept: INTERNAL MEDICINE | Facility: CLINIC | Age: 64
End: 2024-12-06
Payer: MEDICAID

## 2024-12-06 VITALS
HEIGHT: 66 IN | OXYGEN SATURATION: 99 % | SYSTOLIC BLOOD PRESSURE: 122 MMHG | DIASTOLIC BLOOD PRESSURE: 74 MMHG | WEIGHT: 165 LBS | HEART RATE: 62 BPM | BODY MASS INDEX: 26.52 KG/M2

## 2024-12-06 DIAGNOSIS — M16.12 PRIMARY OSTEOARTHRITIS OF LEFT HIP: ICD-10-CM

## 2024-12-06 DIAGNOSIS — I25.700 CORONARY ARTERY DISEASE INVOLVING CORONARY BYPASS GRAFT OF NATIVE HEART WITH UNSTABLE ANGINA PECTORIS: ICD-10-CM

## 2024-12-06 DIAGNOSIS — Z09 FOLLOW-UP EXAMINATION: Primary | ICD-10-CM

## 2024-12-06 DIAGNOSIS — I10 PRIMARY HYPERTENSION: ICD-10-CM

## 2024-12-06 DIAGNOSIS — E78.5 HYPERLIPIDEMIA, UNSPECIFIED HYPERLIPIDEMIA TYPE: ICD-10-CM

## 2024-12-06 PROCEDURE — 1126F AMNT PAIN NOTED NONE PRSNT: CPT | Performed by: NURSE PRACTITIONER

## 2024-12-06 PROCEDURE — 99213 OFFICE O/P EST LOW 20 MIN: CPT | Performed by: NURSE PRACTITIONER

## 2024-12-06 PROCEDURE — 3078F DIAST BP <80 MM HG: CPT | Performed by: NURSE PRACTITIONER

## 2024-12-06 PROCEDURE — 1159F MED LIST DOCD IN RCRD: CPT | Performed by: NURSE PRACTITIONER

## 2024-12-06 PROCEDURE — 3074F SYST BP LT 130 MM HG: CPT | Performed by: NURSE PRACTITIONER

## 2024-12-06 PROCEDURE — 1160F RVW MEDS BY RX/DR IN RCRD: CPT | Performed by: NURSE PRACTITIONER

## 2024-12-06 NOTE — PROGRESS NOTES
Office Note     Name: Nik Jones    : 1960     MRN: [id number]     Chief Complaint  Hypertension (Patient reports today for a 6m follow up on hypertension. Patient states that he has no concerns for today's appt. )    Subjective     History of Present Illness:  Nik Jones is a 64 y.o. male who presents today for a routine follow-up on hypertension.    Patient is currently taking amlodipine 10 mg daily and nebivolol 10 mg daily for management of hypertension.  Patient is tolerating these medications well without side effects.  Blood pressure is well-controlled at 122/74 in the office today.  Patient continues to follow with cardiology.  He declines need for medication refills at this time.    Patient had a positive Cologuard test in May 2023.  Patient was agreeable to a colonoscopy at that time.  He has not yet been able to have his colonoscopy completed.  He does mention that this is on his to do list for the upcoming year.  He denies any current GI concerns or complaints.    Patient continues to do well despite severe osteoarthritic changes of the left hip joint noted on previous x-ray.  Patient is able to perform all of his daily duties and activities of daily living without significant pain or issues.    No further complaints or concerns at this time.  Pleasant visit with the patient today.      Past Medical History:   Diagnosis Date    Heart failure     HTN (hypertension)     Hyperlipemia        Past Surgical History:   Procedure Laterality Date    CARDIAC CATHETERIZATION N/A 2022    Procedure: Left Heart Cath;  Surgeon: Quentin Mccall MD;  Location:  [facility];  Service: Cardiology;  Laterality: N/A;    CARDIAC CATHETERIZATION N/A 11/3/2022    Procedure: Percutaneous Coronary Intervention - PCI to Cx;  Surgeon: Quentin Mccall MD;  Location:  [facility];  Service: Cardiology;  Laterality: N/A;       Social History     Socioeconomic History     "Marital status:    Tobacco Use    Smoking status: Never     Passive exposure: Past    Smokeless tobacco: Never   Vaping Use    Vaping status: Never Used   Substance and Sexual Activity    Alcohol use: Not Currently    Drug use: Never    Sexual activity: Yes     Partners: Female         Current Outpatient Medications:     amLODIPine (NORVASC) 10 MG tablet, Take 1 tablet by mouth Daily., Disp: 90 tablet, Rfl: 3    Aspirin Low Dose 81 MG EC tablet, TAKE 1 TABLET BY MOUTH DAILY, Disp: 90 tablet, Rfl: 3    clopidogrel (PLAVIX) 75 MG tablet, TAKE 1 TABLET BY MOUTH DAILY, Disp: 90 tablet, Rfl: 3    nebivolol (BYSTOLIC) 10 MG tablet, Take 1 tablet by mouth Daily., Disp: 90 tablet, Rfl: 3    nitroglycerin (NITROSTAT) 0.4 MG SL tablet, 1 under the tongue as needed for angina, may repeat q5mins for up three doses, Disp: 25 tablet, Rfl: 0    rosuvastatin (CRESTOR) 40 MG tablet, TAKE ONE TABLET BY MOUTH ONCE NIGHTLY, Disp: 90 tablet, Rfl: 3    Objective     Vital Signs  /74   Pulse 62   Ht 167.6 cm (66\")   Wt 74.8 kg (165 lb)   SpO2 99%   BMI 26.63 kg/m²   Estimated body mass index is 26.63 kg/m² as calculated from the following:    Height as of this encounter: 167.6 cm (66\").    Weight as of this encounter: 74.8 kg (165 lb).           Physical Exam  Constitutional:       Appearance: Normal appearance.   HENT:      Head: Normocephalic and atraumatic.      Nose: Nose normal.   Eyes:      Extraocular Movements: Extraocular movements intact.      Conjunctiva/sclera: Conjunctivae normal.      Pupils: Pupils are equal, round, and reactive to light.   Cardiovascular:      Rate and Rhythm: Normal rate.   Pulmonary:      Effort: Pulmonary effort is normal. No respiratory distress.   Musculoskeletal:         General: Normal range of motion.      Cervical back: Normal range of motion and neck supple.   Skin:     General: Skin is warm and dry.   Neurological:      General: No focal deficit present.      Mental Status: He " is alert and oriented to person, place, and time. Mental status is at baseline.   Psychiatric:         Mood and Affect: Mood normal.         Behavior: Behavior normal.         Thought Content: Thought content normal.         Judgment: Judgment normal.          Assessment and Plan     Diagnoses and all orders for this visit:    1. Follow-up examination (Primary)    2. Hyperlipidemia, unspecified hyperlipidemia type    3. Primary hypertension    4. Primary osteoarthritis of left hip    5. Coronary artery disease involving coronary bypass graft of native heart with unstable angina pectoris        Follow Up  Return if symptoms worsen or fail to improve, for Next scheduled follow up.    JUSTYN Pardo    Part of this note may be an electronic transcription/translation of spoken language to printed text using the Dragon Dictation System.  Answers submitted by the patient for this visit:  Primary Reason for Visit (Submitted on 12/4/2024)  What is the primary reason for your visit?: Problem Not Listed  Problem not listed (Submitted on 12/4/2024)  Chief Complaint: Other medical problem  Reason for appointment: Follow up appointment  abdominal pain: No  anorexia: No  joint pain: No  change in stool: No  chest pain: No  chills: No  nasal congestion: No  cough: No  diaphoresis: No  fatigue: No  fever: No  headaches: No  joint swelling: No  myalgias: No  nausea: No  neck pain: No  numbness: No  rash: No  sore throat: No  swollen glands: No  dysuria: No  vertigo: No  visual change: No  vomiting: No  weakness: No

## 2025-02-04 RX ORDER — AMLODIPINE BESYLATE 10 MG/1
10 TABLET ORAL DAILY
Qty: 90 TABLET | Refills: 3 | Status: SHIPPED | OUTPATIENT
Start: 2025-02-04 | End: 2025-02-04 | Stop reason: SDUPTHER

## 2025-02-04 RX ORDER — NEBIVOLOL 10 MG/1
10 TABLET ORAL DAILY
Qty: 90 TABLET | Refills: 3 | Status: SHIPPED | OUTPATIENT
Start: 2025-02-04

## 2025-02-04 RX ORDER — NEBIVOLOL 10 MG/1
10 TABLET ORAL DAILY
Qty: 90 TABLET | Refills: 3 | Status: SHIPPED | OUTPATIENT
Start: 2025-02-04 | End: 2025-02-04 | Stop reason: SDUPTHER

## 2025-02-04 RX ORDER — AMLODIPINE BESYLATE 10 MG/1
10 TABLET ORAL DAILY
Qty: 90 TABLET | Refills: 3 | Status: SHIPPED | OUTPATIENT
Start: 2025-02-04

## 2025-03-04 RX ORDER — ASPIRIN 81 MG/1
81 TABLET ORAL DAILY
Qty: 90 TABLET | Refills: 3 | Status: SHIPPED | OUTPATIENT
Start: 2025-03-04

## 2025-03-04 RX ORDER — CLOPIDOGREL BISULFATE 75 MG/1
75 TABLET ORAL DAILY
Qty: 90 TABLET | Refills: 3 | Status: SHIPPED | OUTPATIENT
Start: 2025-03-04

## 2025-03-04 RX ORDER — ROSUVASTATIN CALCIUM 40 MG/1
40 TABLET, COATED ORAL NIGHTLY
Qty: 90 TABLET | Refills: 3 | Status: SHIPPED | OUTPATIENT
Start: 2025-03-04

## 2025-03-04 RX ORDER — AMLODIPINE BESYLATE 10 MG/1
10 TABLET ORAL DAILY
Qty: 90 TABLET | Refills: 3 | Status: SHIPPED | OUTPATIENT
Start: 2025-03-04

## 2025-03-04 RX ORDER — NEBIVOLOL 10 MG/1
10 TABLET ORAL DAILY
Qty: 90 TABLET | Refills: 3 | Status: SHIPPED | OUTPATIENT
Start: 2025-03-04

## 2025-03-13 ENCOUNTER — PATIENT ROUNDING (BHMG ONLY) (OUTPATIENT)
Dept: CARDIOLOGY | Facility: CLINIC | Age: 65
End: 2025-03-13
Payer: MEDICAID

## 2025-03-13 ENCOUNTER — OFFICE VISIT (OUTPATIENT)
Dept: CARDIOLOGY | Facility: CLINIC | Age: 65
End: 2025-03-13
Payer: MEDICAID

## 2025-03-13 VITALS
HEIGHT: 66 IN | DIASTOLIC BLOOD PRESSURE: 70 MMHG | WEIGHT: 162.8 LBS | HEART RATE: 57 BPM | OXYGEN SATURATION: 97 % | BODY MASS INDEX: 26.16 KG/M2 | SYSTOLIC BLOOD PRESSURE: 124 MMHG

## 2025-03-13 DIAGNOSIS — I10 PRIMARY HYPERTENSION: ICD-10-CM

## 2025-03-13 DIAGNOSIS — I25.10 CORONARY ARTERY DISEASE INVOLVING NATIVE CORONARY ARTERY OF NATIVE HEART WITHOUT ANGINA PECTORIS: Primary | ICD-10-CM

## 2025-03-13 DIAGNOSIS — E78.5 HYPERLIPIDEMIA, UNSPECIFIED HYPERLIPIDEMIA TYPE: ICD-10-CM

## 2025-03-13 PROCEDURE — 93000 ELECTROCARDIOGRAM COMPLETE: CPT | Performed by: INTERNAL MEDICINE

## 2025-03-13 PROCEDURE — 3078F DIAST BP <80 MM HG: CPT | Performed by: INTERNAL MEDICINE

## 2025-03-13 PROCEDURE — 3074F SYST BP LT 130 MM HG: CPT | Performed by: INTERNAL MEDICINE

## 2025-03-13 PROCEDURE — 99214 OFFICE O/P EST MOD 30 MIN: CPT | Performed by: INTERNAL MEDICINE

## 2025-03-13 NOTE — PROGRESS NOTES
[facility] Cardiology  [address], [address]  [place], [place], [address] [phone]  [email or web address]           OUTPATIENT CLINIC FOLLOW UP NOTE    Patient care team:  Patient Care Team:  Brielle Tinoco APRN as PCP - General (Internal Medicine)  Quentin Mccall MD as Consulting Physician (Cardiology)      Subjective:   Chief complaint:   Chief Complaint   Patient presents with    Coronary artery disease involving native coronary artery of       HPI:    Nik Jones is a 64 y.o. male.  Cardiac focused problem list:  CAD   [facility] 11/01/2022:  Severe multivessel CAD involving a large dominant circumflex and a small nondominant RCA.  Echocardiogram 11/01/2022:  LVEF 56-60%. Mild LVH. Hypokinetic wall segments including basal infereolateral and basal inferior. Grade I impaired relaxation.  [facility] 11/03/2022: Tandem 70 and 90% mid to distal circumflex lesions status post VANGIE x 2 distally.  Second OM branch was also treated with kissing balloon inflation.   Hypertension   Mixed hyperlipidemia    Patient presents today for follow up.     Doing well.  Denies chest pain, dyspnea, palpitations    Plays pickleball, tennis, basketball without cardiopulmonary symptoms.    Review of Systems:  As noted above in the HPI    PFSH:  Patient Active Problem List   Diagnosis    Precordial pain    Unstable angina    Coronary artery disease involving coronary bypass graft of native heart with unstable angina pectoris    Hypertension    Hyperlipidemia    Positive colorectal cancer screening using Cologuard test    Osteoarthritis of left hip         Current Outpatient Medications:     amLODIPine (NORVASC) 10 MG tablet, Take 1 tablet by mouth Daily., Disp: 90 tablet, Rfl: 3    aspirin (Aspirin Low Dose) 81 MG EC tablet, Take 1 tablet by mouth Daily., Disp: 90 tablet, Rfl: 3    nebivolol (BYSTOLIC) 10 MG tablet, Take 1 tablet by mouth Daily., Disp: 90 tablet, Rfl: 3    nitroglycerin (NITROSTAT) 0.4 MG SL  "tablet, 1 under the tongue as needed for angina, may repeat q5mins for up three doses, Disp: 25 tablet, Rfl: 0    rosuvastatin (CRESTOR) 40 MG tablet, Take 1 tablet by mouth Every Night., Disp: 90 tablet, Rfl: 3    No Known Allergies    Social History     Socioeconomic History    Marital status:    Tobacco Use    Smoking status: Never     Passive exposure: Past    Smokeless tobacco: Never   Vaping Use    Vaping status: Never Used   Substance and Sexual Activity    Alcohol use: Not Currently    Drug use: Never    Sexual activity: Yes     Partners: Female         Objective:   Physical Exam:  /70 (BP Location: Left arm, Patient Position: Sitting, Cuff Size: Adult)   Pulse 57   Ht 167.6 cm (65.98\")   Wt 73.8 kg (162 lb 12.8 oz)   SpO2 97%   BMI 26.29 kg/m²   CONSTITUTIONAL: No acute distress  CARDIOVASCULAR: Regular rate and rhythm with normal S1 and S2. Without murmur.  PERIPHERAL VASCULAR: Normal radial pulse.  No carotid bruit      Labs:  Labs reviewed by myself      Lab Results   Component Value Date    CHOL 126 06/04/2024     Lab Results   Component Value Date    TRIG 76 06/04/2024     Lab Results   Component Value Date    HDL 44 06/04/2024     Lab Results   Component Value Date    LDL 67 06/04/2024     No components found for: \"LDLDIRECTC\"    Diagnostic Data:      ECG 12 Lead    Date/Time: 3/13/2025 11:45 AM  Performed by: Quentin Mccall MD    Authorized by: Quentin Mccall MD  Comparison: compared with previous ECG from 2/12/2024  Similar to previous ECG  Comparison to previous ECG: Nonspecific inferolateral ST T wave abnormality, unchanged  Rhythm: sinus rhythm          Results for orders placed during the hospital encounter of 11/01/22    Adult Transthoracic Echo Complete w/ Color, Spectral and Contrast if Necessary Per Protocol    Interpretation Summary    Left ventricular systolic function is normal. Left ventricular ejection fraction appears to be 56 - 60%.    Left ventricular wall thickness " is consistent with mild concentric hypertrophy.    The following left ventricular wall segments are hypokinetic: basal inferolateral and basal inferior.    Left ventricular diastolic function is consistent with (grade I) impaired relaxation.      Assessment and Plan:     Coronary artery disease   Hyperlipidemia  -Discontinue clopidogrel  -Continue other current related cardiac medicines.  -Did not tolerate higher doses of Bystolic due to relative bradycardia  -Encouraged heart healthy diet and exercise  -Repeat FLP at next PCP visit in June    Preoperative cardiac risk assessment  Abnormal, but stable EKG  -Has previously considered hip surgery.  If/when he decides to move forward with it, we can determine preoperative cardiac risk at that time      - Return in about 1 year (around 3/13/2026) for Next follow up with JUSTYN Dias, Next follow up with an ECG, if none in last year.

## 2025-03-13 NOTE — PROGRESS NOTES
March 13, 2025    Hello, may I speak with Nik Jones?    My name is Mireille Norwood      I am  with [facility]  [address]  [address]  [place] [address] [phone].    Before we get started may I verify your date of birth? 1960    I am calling to officially welcome you to our practice and ask about your recent visit. Is this a good time to talk? yes    Tell me about your visit with us. What things went well?  Customer Serv was great        We're always looking for ways to make our patients' experiences even better. Do you have recommendations on ways we may improve?  no    Overall were you satisfied with your first visit to our practice? yes       I appreciate you taking the time to speak with me today. Is there anything else I can do for you? no      Thank you, and have a great day.       Yes

## 2025-06-09 ENCOUNTER — LAB (OUTPATIENT)
Dept: LAB | Facility: HOSPITAL | Age: 65
End: 2025-06-09
Payer: MEDICAID

## 2025-06-09 ENCOUNTER — OFFICE VISIT (OUTPATIENT)
Dept: INTERNAL MEDICINE | Facility: CLINIC | Age: 65
End: 2025-06-09
Payer: MEDICAID

## 2025-06-09 VITALS
OXYGEN SATURATION: 99 % | WEIGHT: 162.2 LBS | HEART RATE: 60 BPM | BODY MASS INDEX: 26.07 KG/M2 | DIASTOLIC BLOOD PRESSURE: 66 MMHG | HEIGHT: 66 IN | SYSTOLIC BLOOD PRESSURE: 128 MMHG

## 2025-06-09 DIAGNOSIS — I25.700 CORONARY ARTERY DISEASE INVOLVING CORONARY BYPASS GRAFT OF NATIVE HEART WITH UNSTABLE ANGINA PECTORIS: ICD-10-CM

## 2025-06-09 DIAGNOSIS — Z13.1 SCREENING FOR DIABETES MELLITUS (DM): ICD-10-CM

## 2025-06-09 DIAGNOSIS — Z00.00 ANNUAL PHYSICAL EXAM: Primary | ICD-10-CM

## 2025-06-09 DIAGNOSIS — Z12.5 SCREENING FOR PROSTATE CANCER: ICD-10-CM

## 2025-06-09 DIAGNOSIS — M16.12 PRIMARY OSTEOARTHRITIS OF LEFT HIP: ICD-10-CM

## 2025-06-09 DIAGNOSIS — R19.5 POSITIVE COLORECTAL CANCER SCREENING USING COLOGUARD TEST: ICD-10-CM

## 2025-06-09 DIAGNOSIS — I10 PRIMARY HYPERTENSION: ICD-10-CM

## 2025-06-09 DIAGNOSIS — Z13.29 SCREENING FOR HYPOTHYROIDISM: ICD-10-CM

## 2025-06-09 DIAGNOSIS — E78.5 HYPERLIPIDEMIA, UNSPECIFIED HYPERLIPIDEMIA TYPE: ICD-10-CM

## 2025-06-09 LAB
ALBUMIN SERPL-MCNC: 4.2 G/DL (ref 3.5–5.2)
ALBUMIN UR-MCNC: <1.2 MG/DL
ALBUMIN/GLOB SERPL: 1.2 G/DL
ALP SERPL-CCNC: 85 U/L (ref 39–117)
ALT SERPL W P-5'-P-CCNC: 17 U/L (ref 1–41)
ANION GAP SERPL CALCULATED.3IONS-SCNC: 12 MMOL/L (ref 5–15)
AST SERPL-CCNC: 24 U/L (ref 1–40)
BILIRUB SERPL-MCNC: 0.7 MG/DL (ref 0–1.2)
BUN SERPL-MCNC: 16 MG/DL (ref 8–23)
BUN/CREAT SERPL: 11.9 (ref 7–25)
CALCIUM SPEC-SCNC: 9.3 MG/DL (ref 8.6–10.5)
CHLORIDE SERPL-SCNC: 104 MMOL/L (ref 98–107)
CHOLEST SERPL-MCNC: 161 MG/DL (ref 0–200)
CO2 SERPL-SCNC: 24 MMOL/L (ref 22–29)
CREAT SERPL-MCNC: 1.35 MG/DL (ref 0.76–1.27)
CREAT UR-MCNC: 198.7 MG/DL
DEPRECATED RDW RBC AUTO: 41.7 FL (ref 37–54)
EGFRCR SERPLBLD CKD-EPI 2021: 58.6 ML/MIN/1.73
ERYTHROCYTE [DISTWIDTH] IN BLOOD BY AUTOMATED COUNT: 13.4 % (ref 12.3–15.4)
GLOBULIN UR ELPH-MCNC: 3.4 GM/DL
GLUCOSE SERPL-MCNC: 117 MG/DL (ref 65–99)
HBA1C MFR BLD: 6 % (ref 4.8–5.6)
HCT VFR BLD AUTO: 47.2 % (ref 37.5–51)
HDLC SERPL-MCNC: 49 MG/DL (ref 40–60)
HGB BLD-MCNC: 14.7 G/DL (ref 13–17.7)
LDLC SERPL CALC-MCNC: 93 MG/DL (ref 0–100)
LDLC/HDLC SERPL: 1.86 {RATIO}
MCH RBC QN AUTO: 26.3 PG (ref 26.6–33)
MCHC RBC AUTO-ENTMCNC: 31.1 G/DL (ref 31.5–35.7)
MCV RBC AUTO: 84.4 FL (ref 79–97)
MICROALBUMIN/CREAT UR: NORMAL MG/G{CREAT}
PLATELET # BLD AUTO: 260 10*3/MM3 (ref 140–450)
PMV BLD AUTO: 11.1 FL (ref 6–12)
POTASSIUM SERPL-SCNC: 4.6 MMOL/L (ref 3.5–5.2)
PROT SERPL-MCNC: 7.6 G/DL (ref 6–8.5)
PSA SERPL-MCNC: 1.3 NG/ML (ref 0–4)
RBC # BLD AUTO: 5.59 10*6/MM3 (ref 4.14–5.8)
SODIUM SERPL-SCNC: 140 MMOL/L (ref 136–145)
TRIGL SERPL-MCNC: 104 MG/DL (ref 0–150)
TSH SERPL DL<=0.05 MIU/L-ACNC: 1.22 UIU/ML (ref 0.27–4.2)
VLDLC SERPL-MCNC: 19 MG/DL (ref 5–40)
WBC NRBC COR # BLD AUTO: 5.13 10*3/MM3 (ref 3.4–10.8)

## 2025-06-09 PROCEDURE — 82570 ASSAY OF URINE CREATININE: CPT | Performed by: NURSE PRACTITIONER

## 2025-06-09 PROCEDURE — 36415 COLL VENOUS BLD VENIPUNCTURE: CPT | Performed by: NURSE PRACTITIONER

## 2025-06-09 PROCEDURE — 84443 ASSAY THYROID STIM HORMONE: CPT | Performed by: NURSE PRACTITIONER

## 2025-06-09 PROCEDURE — 83036 HEMOGLOBIN GLYCOSYLATED A1C: CPT | Performed by: NURSE PRACTITIONER

## 2025-06-09 PROCEDURE — 85027 COMPLETE CBC AUTOMATED: CPT | Performed by: NURSE PRACTITIONER

## 2025-06-09 PROCEDURE — 80053 COMPREHEN METABOLIC PANEL: CPT | Performed by: NURSE PRACTITIONER

## 2025-06-09 PROCEDURE — G0103 PSA SCREENING: HCPCS | Performed by: NURSE PRACTITIONER

## 2025-06-09 PROCEDURE — 82043 UR ALBUMIN QUANTITATIVE: CPT | Performed by: NURSE PRACTITIONER

## 2025-06-09 PROCEDURE — 80061 LIPID PANEL: CPT | Performed by: NURSE PRACTITIONER

## 2025-06-09 NOTE — PROGRESS NOTES
Office Note     Name: Nik Jones    : 1960     MRN: [id number]     Chief Complaint  Annual Exam    Subjective     History of Present Illness:  Nik Jones is a 64 y.o. male who presents today for annual physical exam.      The patient is being seen for a health maintenance evaluation.    Past Medical History:   Diagnosis Date    Heart failure     HTN (hypertension)     Hyperlipemia        Past Surgical History:   Procedure Laterality Date    CARDIAC CATHETERIZATION N/A 2022    Procedure: Left Heart Cath;  Surgeon: Quentin Mccall MD;  Location:  [facility];  Service: Cardiology;  Laterality: N/A;    CARDIAC CATHETERIZATION N/A 11/3/2022    Procedure: Percutaneous Coronary Intervention - PCI to Cx;  Surgeon: Quentin Mccall MD;  Location:  [facility];  Service: Cardiology;  Laterality: N/A;       Social History     Socioeconomic History    Marital status:    Tobacco Use    Smoking status: Never     Passive exposure: Past    Smokeless tobacco: Never   Vaping Use    Vaping status: Never Used   Substance and Sexual Activity    Alcohol use: Not Currently    Drug use: Never    Sexual activity: Yes     Partners: Female         Current Outpatient Medications:     amLODIPine (NORVASC) 10 MG tablet, Take 1 tablet by mouth Daily., Disp: 90 tablet, Rfl: 3    aspirin (Aspirin Low Dose) 81 MG EC tablet, Take 1 tablet by mouth Daily., Disp: 90 tablet, Rfl: 3    nebivolol (BYSTOLIC) 10 MG tablet, Take 1 tablet by mouth Daily., Disp: 90 tablet, Rfl: 3    rosuvastatin (CRESTOR) 40 MG tablet, Take 1 tablet by mouth Every Night., Disp: 90 tablet, Rfl: 3    nitroglycerin (NITROSTAT) 0.4 MG SL tablet, 1 under the tongue as needed for angina, may repeat q5mins for up three doses (Patient not taking: Reported on 2025), Disp: 25 tablet, Rfl: 0    General History  Nik  does not have regular dental visits. Waiting to transition to Medicare.  He does complain of  "vision problems. Last eye exam was unknown. Wears reading glasses.  Immunizations are not up to date. The patient needs the following immunizations: Declines vaccines today.    Lifestyle  Nik  consumes a in general, a \"healthy\" diet  .  He exercises plays [employer] ball 2-3 times weekly.    Reproductive Health  Nik  is sexually active. His contraceptive plan is no method.  He does not have erectile dysfunction.     Screening  Last PSA was never.  Last prostate exam was never. Family history of prostate cancer: Pt is adopted.  Last testicular exam was self.   Last colonoscopy was Cologuard 2023 was positive. Will plan on doing colonoscopy when able.  Last Completed Colonoscopy            Upcoming       COLORECTAL CANCER SCREENING (COLOGUARD - Every 3 Years) Next due on 5/26/2026 05/26/2023  Cologuard component of Cologuard - Stool, Per Rectum                        . Family history of colon cancer: Pt is adopted.  Other pertinent family history and/or screenings: None at this time.    Review of Systems   Musculoskeletal:  Positive for arthralgias.       Objective     Vital Signs  /66   Pulse 60   Ht 167.6 cm (65.98\")   Wt 73.6 kg (162 lb 3.2 oz)   SpO2 99%   BMI 26.19 kg/m²   Estimated body mass index is 26.19 kg/m² as calculated from the following:    Height as of this encounter: 167.6 cm (65.98\").    Weight as of this encounter: 73.6 kg (162 lb 3.2 oz).    BMI is >= 25 and <30. (Overweight) The following options were offered after discussion;: exercise counseling/recommendations and nutrition counseling/recommendations       Physical Exam  Constitutional:       General: He is awake. He is not in acute distress.     Appearance: Normal appearance. He is well-developed and well-groomed. He is not ill-appearing.   HENT:      Head: Normocephalic and atraumatic.      Right Ear: Tympanic membrane, ear canal and external ear normal.      Left Ear: Tympanic membrane, ear canal and external ear normal.    "   Nose: Nose normal.      Mouth/Throat:      Mouth: Mucous membranes are moist.      Pharynx: Oropharynx is clear.   Eyes:      General: No scleral icterus.     Extraocular Movements: Extraocular movements intact.      Conjunctiva/sclera: Conjunctivae normal.      Pupils: Pupils are equal, round, and reactive to light.   Neck:      Trachea: Trachea normal.   Cardiovascular:      Rate and Rhythm: Normal rate and regular rhythm.      Pulses: Normal pulses.      Heart sounds: Normal heart sounds. No murmur heard.  Pulmonary:      Effort: Pulmonary effort is normal. No tachypnea, accessory muscle usage or respiratory distress.      Breath sounds: Normal breath sounds. No wheezing, rhonchi or rales.   Abdominal:      General: Abdomen is flat. Bowel sounds are normal. There is no distension.      Palpations: Abdomen is soft.      Tenderness: There is no abdominal tenderness.   Genitourinary:     Comments: Deferred  Musculoskeletal:         General: No swelling. Normal range of motion.      Cervical back: Normal range of motion and neck supple. No tenderness.      Right lower leg: No edema.      Left lower leg: No edema.   Skin:     General: Skin is warm and dry.      Capillary Refill: Capillary refill takes less than 2 seconds.      Coloration: Skin is not jaundiced or pale.      Findings: No lesion or rash.   Neurological:      General: No focal deficit present.      Mental Status: He is alert and oriented to person, place, and time. Mental status is at baseline.      Motor: No weakness.      Gait: Gait is intact.   Psychiatric:         Attention and Perception: Attention normal.         Mood and Affect: Mood normal.         Speech: Speech normal.         Behavior: Behavior normal. Behavior is cooperative.         Thought Content: Thought content normal.         Judgment: Judgment normal.          Assessment and Plan     Diagnoses and all orders for this visit:    1. Annual physical exam (Primary)  -     CBC (No Diff)  -      Comprehensive metabolic panel  -     TSH Rfx On Abnormal To Free T4  -     Lipid Panel  -     Hemoglobin A1c  -     PSA Screen  -     Microalbumin / Creatinine Urine Ratio - Urine, Clean Catch    2. Primary hypertension  -     CBC (No Diff)  -     Comprehensive metabolic panel    3. Coronary artery disease involving coronary bypass graft of native heart with unstable angina pectoris    4. Hyperlipidemia, unspecified hyperlipidemia type  -     Lipid Panel    5. Primary osteoarthritis of left hip    6. Positive colorectal cancer screening using Cologuard test    7. Screening for hypothyroidism  -     TSH Rfx On Abnormal To Free T4    8. Screening for diabetes mellitus (DM)  -     Hemoglobin A1c  -     Microalbumin / Creatinine Urine Ratio - Urine, Clean Catch    9. Screening for prostate cancer  -     PSA Screen        Plan:  Annual physical exam.  Orders placed for fasting labs.  Will review lab results with patient once received and reviewed.  Further plan of care based on lab result findings.  Patient with positive Cologuard test in the past.  He will schedule a colonoscopy when he is able.  PSA screening to be completed today.  Continue with up to date immunizations and health maintenance screenings.  Continue with healthy lifestyle choices such as healthy diet and eating habits and regular exercise routine.  Continue with adequate oral hydration and rest.  Follow-up on chronic conditions in 6 months.  Annual physical exam in 1 year.  Return to clinic sooner if needed.      Follow Up  Return in about 6 months (around 12/9/2025) for 6-month follow-up and annual physical exam in 1 year.      JUSTYN Pardo    Part of this note may be an electronic transcription/translation of spoken language to printed text using the Dragon Dictation System.

## (undated) DEVICE — ADULT, W/LG. BACK PAD, RADIOTRANSPARENT ELEMENT AND LEAD WIRE: Brand: DEFIBRILLATION ELECTRODES

## (undated) DEVICE — MODEL BT2000 P/N 700287-012KIT CONTENTS: MANIFOLD WITH SALINE AND CONTRAST PORTS, SALINE TUBING WITH SPIKE AND HAND SYRINGE, TRANSDUCER: Brand: BT2000 AUTOMATED MANIFOLD KIT

## (undated) DEVICE — Device: Brand: OMNIWIRE PRESSURE GUIDE WIRE

## (undated) DEVICE — GUIDE CATHETER: Brand: MACH1™

## (undated) DEVICE — ST ACC MICROPUNCTURE .018 TRANSLSS/SS/TP 5F/10CM 21G/7CM

## (undated) DEVICE — CATH DIAG EXPO .045 FL3  5F 100CM

## (undated) DEVICE — CVR PROB ULTRASND/TRANSD W/GEL 7X11IN STRL

## (undated) DEVICE — CATH INTRAVAS ULTRASND EAGLE EYE 2.9FR

## (undated) DEVICE — NC TREK CORONARY DILATATION CATHETER 2.5 MM X 15 MM / RAPID-EXCHANGE: Brand: NC TREK

## (undated) DEVICE — MODEL AT P65, P/N 701554-001KIT CONTENTS: HAND CONTROLLER, 3-WAY HIGH-PRESSURE STOPCOCK WITH ROTATING END AND PREMIUM HIGH-PRESSURE TUBING: Brand: ANGIOTOUCH® KIT

## (undated) DEVICE — PINNACLE INTRODUCER SHEATH: Brand: PINNACLE

## (undated) DEVICE — NC TREK CORONARY DILATATION CATHETER 3.5 MM X 15 MM / RAPID-EXCHANGE: Brand: NC TREK

## (undated) DEVICE — Device: Brand: ASAHI SION BLUE

## (undated) DEVICE — DEV COMP RAD PRELUDESYNC 24CM

## (undated) DEVICE — GLIDESHEATH SLENDER STAINLESS STEEL KIT: Brand: GLIDESHEATH SLENDER

## (undated) DEVICE — KT VLV HEMO MAP ACC PLS LG/BORE MTL/INTRO W/TORQ/DEV

## (undated) DEVICE — DEV INFL MONARCH 25W

## (undated) DEVICE — CATH DIAG EXPO M/ PK 5F FL4/FR4 PIG

## (undated) DEVICE — TREK CORONARY DILATATION CATHETER 2.50 MM X 15 MM / RAPID-EXCHANGE: Brand: TREK

## (undated) DEVICE — PK CATH CARD 10

## (undated) DEVICE — GW PERIPH VASC ADX J/TP SS .035 150CM 3MM

## (undated) DEVICE — ANGIO-SEAL VIP VASCULAR CLOSURE DEVICE: Brand: ANGIO-SEAL